# Patient Record
Sex: MALE | Race: WHITE | NOT HISPANIC OR LATINO | Employment: OTHER | URBAN - METROPOLITAN AREA
[De-identification: names, ages, dates, MRNs, and addresses within clinical notes are randomized per-mention and may not be internally consistent; named-entity substitution may affect disease eponyms.]

---

## 2017-01-19 ENCOUNTER — TRANSCRIBE ORDERS (OUTPATIENT)
Dept: SLEEP MEDICINE | Facility: HOSPITAL | Age: 67
End: 2017-01-19

## 2017-01-19 DIAGNOSIS — G47.61 PERIODIC LIMB MOVEMENT DISORDER: Primary | ICD-10-CM

## 2017-01-19 DIAGNOSIS — G47.33 OBSTRUCTIVE SLEEP APNEA (ADULT) (PEDIATRIC): ICD-10-CM

## 2017-02-02 ENCOUNTER — HOSPITAL ENCOUNTER (OUTPATIENT)
Dept: SLEEP MEDICINE | Facility: HOSPITAL | Age: 67
Discharge: HOME | End: 2017-02-02
Admitting: INTERNAL MEDICINE

## 2017-02-02 VITALS
OXYGEN SATURATION: 97 % | SYSTOLIC BLOOD PRESSURE: 140 MMHG | WEIGHT: 184.2 LBS | BODY MASS INDEX: 25.79 KG/M2 | HEART RATE: 62 BPM | DIASTOLIC BLOOD PRESSURE: 80 MMHG | HEIGHT: 71 IN

## 2017-02-02 DIAGNOSIS — G47.33 OBSTRUCTIVE SLEEP APNEA (ADULT) (PEDIATRIC): ICD-10-CM

## 2017-02-02 DIAGNOSIS — G47.61 PERIODIC LIMB MOVEMENT DISORDER: ICD-10-CM

## 2017-02-02 PROCEDURE — 95806 SLEEP STUDY UNATT&RESP EFFT: CPT | Performed by: INTERNAL MEDICINE

## 2017-02-02 RX ORDER — FLUTICASONE PROPIONATE 50 MCG
2 SPRAY, SUSPENSION (ML) NASAL DAILY
COMMUNITY
End: 2019-05-29

## 2017-02-02 RX ORDER — ALPRAZOLAM 1 MG/1
1 TABLET ORAL NIGHTLY PRN
COMMUNITY
End: 2019-05-29

## 2017-02-02 RX ORDER — NAPROXEN 500 MG/1
500 TABLET ORAL 2 TIMES DAILY WITH MEALS
COMMUNITY
End: 2019-06-14

## 2017-02-02 RX ORDER — AZELASTINE 1 MG/ML
2 SPRAY, METERED NASAL 2 TIMES DAILY
COMMUNITY
End: 2019-06-14

## 2017-02-02 RX ORDER — CITALOPRAM 10 MG/1
10 TABLET ORAL DAILY
COMMUNITY
End: 2019-05-29

## 2017-02-02 RX ORDER — IBUPROFEN 800 MG/1
800 TABLET ORAL EVERY 6 HOURS PRN
COMMUNITY
End: 2019-05-29

## 2019-05-29 ENCOUNTER — OFFICE VISIT (OUTPATIENT)
Dept: FAMILY MEDICINE CLINIC | Facility: CLINIC | Age: 69
End: 2019-05-29

## 2019-05-29 VITALS
HEART RATE: 64 BPM | HEIGHT: 72 IN | RESPIRATION RATE: 16 BRPM | DIASTOLIC BLOOD PRESSURE: 80 MMHG | SYSTOLIC BLOOD PRESSURE: 124 MMHG | WEIGHT: 208.8 LBS | OXYGEN SATURATION: 98 % | BODY MASS INDEX: 28.28 KG/M2

## 2019-05-29 DIAGNOSIS — Z00.00 INITIAL MEDICARE ANNUAL WELLNESS VISIT: Primary | ICD-10-CM

## 2019-05-29 DIAGNOSIS — F41.1 GAD (GENERALIZED ANXIETY DISORDER): ICD-10-CM

## 2019-05-29 DIAGNOSIS — R79.9 ABNORMAL FINDING OF BLOOD CHEMISTRY: ICD-10-CM

## 2019-05-29 DIAGNOSIS — Z12.5 SCREENING FOR PROSTATE CANCER: ICD-10-CM

## 2019-05-29 DIAGNOSIS — Z12.11 SCREENING FOR COLON CANCER: ICD-10-CM

## 2019-05-29 DIAGNOSIS — G25.81 RLS (RESTLESS LEGS SYNDROME): ICD-10-CM

## 2019-05-29 LAB
ALBUMIN SERPL-MCNC: 4.3 G/DL (ref 3.5–5.2)
ALBUMIN/GLOB SERPL: 1.4 G/DL
ALP SERPL-CCNC: 81 U/L (ref 39–117)
ALT SERPL W P-5'-P-CCNC: 32 U/L (ref 1–41)
ANION GAP SERPL CALCULATED.3IONS-SCNC: 14.5 MMOL/L
AST SERPL-CCNC: 40 U/L (ref 1–40)
BASOPHILS # BLD AUTO: 0.03 10*3/MM3 (ref 0–0.2)
BASOPHILS NFR BLD AUTO: 0.5 % (ref 0–1.5)
BILIRUB BLD-MCNC: NEGATIVE MG/DL
BILIRUB SERPL-MCNC: 0.4 MG/DL (ref 0.2–1.2)
BUN BLD-MCNC: 15 MG/DL (ref 8–23)
BUN/CREAT SERPL: 11.9 (ref 7–25)
CALCIUM SPEC-SCNC: 9.1 MG/DL (ref 8.6–10.5)
CHLORIDE SERPL-SCNC: 102 MMOL/L (ref 98–107)
CHOLEST SERPL-MCNC: 236 MG/DL (ref 0–200)
CLARITY, POC: CLEAR
CO2 SERPL-SCNC: 26.5 MMOL/L (ref 22–29)
COLOR UR: YELLOW
CREAT BLD-MCNC: 1.26 MG/DL (ref 0.76–1.27)
CRP SERPL-MCNC: 0.21 MG/DL (ref 0–0.5)
DEPRECATED RDW RBC AUTO: 46.3 FL (ref 37–54)
EOSINOPHIL # BLD AUTO: 0.09 10*3/MM3 (ref 0–0.4)
EOSINOPHIL NFR BLD AUTO: 1.6 % (ref 0.3–6.2)
ERYTHROCYTE [DISTWIDTH] IN BLOOD BY AUTOMATED COUNT: 13.9 % (ref 12.3–15.4)
GFR SERPL CREATININE-BSD FRML MDRD: 57 ML/MIN/1.73
GLOBULIN UR ELPH-MCNC: 3 GM/DL
GLUCOSE BLD-MCNC: 93 MG/DL (ref 65–99)
GLUCOSE UR STRIP-MCNC: NEGATIVE MG/DL
HBA1C MFR BLD: 5.63 % (ref 4.8–5.6)
HCT VFR BLD AUTO: 49.1 % (ref 37.5–51)
HCV AB SER DONR QL: NORMAL
HDLC SERPL-MCNC: 47 MG/DL (ref 40–60)
HGB BLD-MCNC: 15.6 G/DL (ref 13–17.7)
HIV1+2 AB SER QL: NORMAL
IMM GRANULOCYTES # BLD AUTO: 0.02 10*3/MM3 (ref 0–0.05)
IMM GRANULOCYTES NFR BLD AUTO: 0.4 % (ref 0–0.5)
KETONES UR QL: NEGATIVE
LDLC SERPL CALC-MCNC: 138 MG/DL (ref 0–100)
LDLC/HDLC SERPL: 2.94 {RATIO}
LEUKOCYTE EST, POC: NEGATIVE
LYMPHOCYTES # BLD AUTO: 1.78 10*3/MM3 (ref 0.7–3.1)
LYMPHOCYTES NFR BLD AUTO: 31.3 % (ref 19.6–45.3)
MCH RBC QN AUTO: 28.9 PG (ref 26.6–33)
MCHC RBC AUTO-ENTMCNC: 31.8 G/DL (ref 31.5–35.7)
MCV RBC AUTO: 91.1 FL (ref 79–97)
MONOCYTES # BLD AUTO: 0.5 10*3/MM3 (ref 0.1–0.9)
MONOCYTES NFR BLD AUTO: 8.8 % (ref 5–12)
NEUTROPHILS # BLD AUTO: 3.27 10*3/MM3 (ref 1.7–7)
NEUTROPHILS NFR BLD AUTO: 57.4 % (ref 42.7–76)
NITRITE UR-MCNC: NEGATIVE MG/ML
NRBC BLD AUTO-RTO: 0 /100 WBC (ref 0–0.2)
PH UR: 6.5 [PH] (ref 5–8)
PLATELET # BLD AUTO: 191 10*3/MM3 (ref 140–450)
PMV BLD AUTO: 12.1 FL (ref 6–12)
POTASSIUM BLD-SCNC: 4.1 MMOL/L (ref 3.5–5.2)
PROT SERPL-MCNC: 7.3 G/DL (ref 6–8.5)
PROT UR STRIP-MCNC: NEGATIVE MG/DL
PSA SERPL-MCNC: 1.1 NG/ML (ref 0–4)
RBC # BLD AUTO: 5.39 10*6/MM3 (ref 4.14–5.8)
RBC # UR STRIP: NEGATIVE /UL
SODIUM BLD-SCNC: 143 MMOL/L (ref 136–145)
SP GR UR: 1.03 (ref 1–1.03)
T4 FREE SERPL-MCNC: 1.12 NG/DL (ref 0.93–1.7)
TRIGL SERPL-MCNC: 255 MG/DL (ref 0–150)
TSH SERPL DL<=0.05 MIU/L-ACNC: 3.14 MIU/ML (ref 0.27–4.2)
URATE SERPL-MCNC: 6.5 MG/DL (ref 3.4–7)
UROBILINOGEN UR QL: NORMAL
VLDLC SERPL-MCNC: 51 MG/DL (ref 5–40)
WBC NRBC COR # BLD: 5.69 10*3/MM3 (ref 3.4–10.8)

## 2019-05-29 PROCEDURE — G0438 PPPS, INITIAL VISIT: HCPCS | Performed by: FAMILY MEDICINE

## 2019-05-29 PROCEDURE — 86140 C-REACTIVE PROTEIN: CPT | Performed by: FAMILY MEDICINE

## 2019-05-29 PROCEDURE — 93000 ELECTROCARDIOGRAM COMPLETE: CPT | Performed by: FAMILY MEDICINE

## 2019-05-29 PROCEDURE — 84550 ASSAY OF BLOOD/URIC ACID: CPT | Performed by: FAMILY MEDICINE

## 2019-05-29 PROCEDURE — G0103 PSA SCREENING: HCPCS | Performed by: FAMILY MEDICINE

## 2019-05-29 PROCEDURE — 84439 ASSAY OF FREE THYROXINE: CPT | Performed by: FAMILY MEDICINE

## 2019-05-29 PROCEDURE — 36415 COLL VENOUS BLD VENIPUNCTURE: CPT | Performed by: FAMILY MEDICINE

## 2019-05-29 PROCEDURE — 85025 COMPLETE CBC W/AUTO DIFF WBC: CPT | Performed by: FAMILY MEDICINE

## 2019-05-29 PROCEDURE — 84443 ASSAY THYROID STIM HORMONE: CPT | Performed by: FAMILY MEDICINE

## 2019-05-29 PROCEDURE — 83036 HEMOGLOBIN GLYCOSYLATED A1C: CPT | Performed by: FAMILY MEDICINE

## 2019-05-29 PROCEDURE — 86803 HEPATITIS C AB TEST: CPT | Performed by: FAMILY MEDICINE

## 2019-05-29 PROCEDURE — 80061 LIPID PANEL: CPT | Performed by: FAMILY MEDICINE

## 2019-05-29 PROCEDURE — 80053 COMPREHEN METABOLIC PANEL: CPT | Performed by: FAMILY MEDICINE

## 2019-05-29 PROCEDURE — 81003 URINALYSIS AUTO W/O SCOPE: CPT | Performed by: FAMILY MEDICINE

## 2019-05-29 PROCEDURE — G0432 EIA HIV-1/HIV-2 SCREEN: HCPCS | Performed by: FAMILY MEDICINE

## 2019-05-29 RX ORDER — ROPINIROLE 1 MG/1
TABLET, FILM COATED ORAL
COMMUNITY
Start: 2019-05-12 | End: 2021-08-13

## 2019-05-29 RX ORDER — ROPINIROLE 2 MG/1
TABLET, FILM COATED ORAL
COMMUNITY
Start: 2019-05-12 | End: 2021-08-13

## 2019-05-29 NOTE — PROGRESS NOTES
The ABCs of the Annual Wellness Visit    HEALTH RISK ASSESSMENT  INITIAL Medicare Wellness Visit  1950    Recent Hospitalizations: NONE    Current Medical Providers: Patient Care Team:  Juan Torre MD as PCP - General (Family Medicine)  Mega Guillen MD as Consulting Physician (Pulmonary Disease)    Smoking Status   Social History    Tobacco Use      Smoking status: Former Smoker        Packs/day: 1.00        Years: 10.00        Pack years: 10        Types: Cigarettes        Quit date:         Years since quittin.4      Smokeless tobacco: Never Used    Alcohol Consumption    reports that he drinks alcohol.    Depression Screen   PHQ-2 Depression Screening  Little interest or pleasure in doing things? 0   Feeling down, depressed, or hopeless? 0   PHQ-2 Total Score 0       Health Habits and Functional and Cognitive Screening  Functional & Cognitive Status 2019   Do you have difficulty preparing food and eating? No   Do you have difficulty bathing yourself, getting dressed or grooming yourself? No   Do you have difficulty using the toilet? No   Do you have difficulty moving around from place to place? No   Do you have trouble with steps or getting out of a bed or a chair? No   In the past year have you fallen or experienced a near fall? No   Current Diet Well Balanced Diet   Dental Exam Up to date   Eye Exam Up to date   Exercise (times per week) 7 times per week   Current Exercise Activities Include Walking   Do you need help using the phone?  No   Are you deaf or do you have serious difficulty hearing?  No   Do you need help with transportation? No   Do you need help shopping? No   Do you need help preparing meals?  No   Do you need help with housework?  No   Do you need help with laundry? No   Do you need help taking your medications? No   Do you need help managing money? No   Do you ever drive or ride in a car without wearing a seat belt? No   Have you felt unusual stress, anger or  loneliness in the last month? No   Who do you live with? Spouse   If you need help, do you have trouble finding someone available to you? No   Have you been bothered in the last four weeks by sexual problems? No   Do you have difficulty concentrating, remembering or making decisions? No      Fall Risk Assessment  Fall Risk Assessment was completed, and patient is at low risk for falls.    Does the patient have evidence of cognitive impairment? No    Recent Lab Results:  New patient to the office.  Previous provider records data deficit today (records requested).    Subjective     History of Present Illness  Adonay Eason is a 68 y.o. male who presents for an Initial Wellness Visit.  He is a new patient with previous and recent care by  Dr. Wayne,  Dr. Rahman, Dr. Gary and Dr. Guillen.  He describes a chronic pain history with previous orthopedic procedures involving his spine, knees and recently right shoulder.  He also describes a chronic mood disorder history and difficulty falling asleep.  He is presently taking two different NSAIDS intermittently for his pain.  He is inquiring about controlled substances to help his pain and mood disorder (patient presents typed sheet for review).    Review of Systems   HENT: Negative.  Negative for trouble swallowing.    Eyes: Negative.  Negative for visual disturbance.   Respiratory: Negative.  Negative for shortness of breath.    Cardiovascular: Negative.  Negative for chest pain, palpitations and leg swelling.   Gastrointestinal: Negative.    Endocrine: Negative.  Negative for polydipsia, polyphagia and polyuria.   Genitourinary: Negative.  Negative for difficulty urinating.   Musculoskeletal: Positive for arthralgias and back pain.   Skin: Negative.  Negative for rash.   Allergic/Immunologic: Negative.    Neurological: Negative.    Hematological: Negative.  Does not bruise/bleed easily.   Psychiatric/Behavioral: Positive for sleep disturbance. The patient is  "nervous/anxious.    All other systems reviewed and are negative.      The following portions of the patient's history were reviewed and updated as appropriate: past medical history, past surgical history, allergies, current medications, past family history and social history.        Current Outpatient Medications:   •  azelastine 0.1 % nasal spray, 2 sprays into each nostril 2 Times a Day.  Disp: , Rfl:   •  naproxen (NAPROSYN) 500 MG tablet, Take 500 mg by mouth 2 (Two) Times a Day With Meals., Disp: , Rfl:   •  rOPINIRole (REQUIP) 1 MG tablet, , Disp: , Rfl:   •  rOPINIRole (REQUIP) 2 MG tablet, , Disp: , Rfl:     Objective     Visual Acuity    Visual Acuity Screening    Right eye Left eye Both eyes   Without correction: 20/20 20/20 20/20   With correction:        Vitals:    05/29/19 0936   BP: 124/80   Pulse: 64   Resp: 16   SpO2: 98%   Weight: 94.7 kg (208 lb 12.8 oz)   Height: 181.6 cm (71.5\")   PainSc: 0-No pain     Body mass index is 28.72 kg/m². Discussed the patient's BMI with him. The BMI is in the acceptable range.    Physical Exam   Constitutional: He is oriented to person, place, and time. He appears well-developed and well-nourished. He is cooperative.   HENT:   Head: Normocephalic and atraumatic.   Right Ear: Hearing and external ear normal.   Left Ear: Hearing and external ear normal.   Nose: Nose normal.   Mouth/Throat: Uvula is midline, oropharynx is clear and moist and mucous membranes are normal.   Eyes: Conjunctivae and EOM are normal. Pupils are equal, round, and reactive to light. No scleral icterus.   Neck: Trachea normal and normal range of motion. Neck supple. No JVD present. Carotid bruit is not present. No thyromegaly present.   Cardiovascular: Normal rate, regular rhythm, normal heart sounds and intact distal pulses.   Pulmonary/Chest: Effort normal and breath sounds normal.   Abdominal: Soft. Bowel sounds are normal. There is no hepatosplenomegaly. There is no tenderness. "   Musculoskeletal: Normal range of motion.   Lymphadenopathy:     He has no cervical adenopathy.   Neurological: He is alert and oriented to person, place, and time. He has normal strength and normal reflexes. No sensory deficit. Gait normal.   Skin: Skin is warm and dry.   Psychiatric: His speech is normal and behavior is normal. Judgment and thought content normal. His mood appears anxious. Cognition and memory are normal.   Nursing note and vitals reviewed.      ECG 12 Lead  Date/Time: 5/29/2019 10:06 AM  Performed by: Juan Torre MD  Authorized by: Juan Torre MD   Comparison: not compared with previous ECG   Previous ECG: no previous ECG available  Rhythm: sinus rhythm  Rate: normal  BPM: 72  Conduction: incomplete right bundle branch block  ST Segments: ST segments normal  T Waves: T waves normal  QRS axis: normal    Clinical impression: abnormal EKG          Compared to one year ago, the patient feels his physical health is the same.  Compared to one year ago, the patient feels his mental health is the same.    Age-appropriate Screening Schedule  Refer to the list below for future screening recommendations based on patient's age, sex and/or medical conditions. Orders for these recommended tests are listed in the plan section. The patient has been provided with a written plan.    Health Maintenance   Topic Date Due   • INFLUENZA VACCINE  08/01/2019   • PNEUMOCOCCAL VACCINES (65+ LOW/MEDIUM RISK) (2 of 2 - PPSV23) 01/03/2020   • COLONOSCOPY  05/29/2029     Advance Care Planning  Patient has an advance directive - a copy has not been provided. Have asked the patient to send this to us to add to record    Identification of Risk Factors  Risk factors include: cardiovascular risk and chronic pain.      Assessment/Plan   Patient Self-Management and Personalized Health Advice  The patient has been provided with information about: diet, exercise, prevention of cardiac or vascular disease and mental health  concerns and preventive services including:   · Advance directive, Colorectal cancer screening, colonoscopy referral placed, Counseling for cardiovascular disease risk reduction, Diabetes screening, see lab orders, Exercise counseling provided, Fall Risk assessment done, Glaucoma screening recommended, Nutrition counseling provided, Prostate cancer screening discussed, Screening electrocardiogram.    Visit Diagnoses:    ICD-10-CM ICD-9-CM   1. Initial Medicare annual wellness visit Z00.00 V70.0   2. RLS (restless legs syndrome) G25.81 333.94   3. TENA (generalized anxiety disorder) F41.1 300.02   4. Screening for colon cancer Z12.11 V76.51   5. Screening for prostate cancer Z12.5 V76.44   6. Abnormal finding of blood chemistry  R79.9 790.6       Orders Placed This Encounter   Procedures   • Comprehensive Metabolic Panel   • Lipid Panel   • TSH   • T4, Free   • Uric Acid   • C-reactive Protein   • PSA Screen   • HIV-1 / O / 2 Ag / Antibody 4th Generation   • Hepatitis C Antibody   • Hemoglobin A1c   • Ambulatory Referral For Screening Colonoscopy   • Ambulatory Referral to Psychology   • Ambulatory Referral to Psychiatry   • POC Urinalysis Dipstick, Automated   • ECG 12 Lead   • CBC & Differential     We discussed NSAIDS.  I have advised using only one NSAID intermittently with food.  We discussed renal physiology.  We discussed a pain management referral (patient deferred today).  He will follow up with his recently treating orthopedist.  I deferred a script for controlled substances today.    Current outpatient and discharge medications have been reconciled for the patient.  Reviewed by: Juan Torre MD    Aspirin counseling:  We discussed current recommendations concerning daily low dose aspirin (81 mg) use in patients with cardiovascular disease.    Reviewed use of high risk medication in the elderly: Not applicable.  Reviewed for potential of harmful drug interactions in the elderly: Not applicable.    Follow  Up:  Return in about 1 year (around 5/29/2020), or if symptoms worsen or fail to improve, for Medicare Wellness.     An After Visit Summary and PPPS with all of these plans were given to the patient.         Juan Torre MD 05/29/19 10:28 AM

## 2019-06-14 ENCOUNTER — OFFICE VISIT (OUTPATIENT)
Dept: ORTHOPEDIC SURGERY | Facility: CLINIC | Age: 69
End: 2019-06-14

## 2019-06-14 VITALS — HEART RATE: 84 BPM | WEIGHT: 203 LBS | BODY MASS INDEX: 28.42 KG/M2 | OXYGEN SATURATION: 96 % | HEIGHT: 71 IN

## 2019-06-14 DIAGNOSIS — M16.12 PRIMARY OSTEOARTHRITIS OF LEFT HIP: Primary | ICD-10-CM

## 2019-06-14 DIAGNOSIS — M25.552 LEFT HIP PAIN: ICD-10-CM

## 2019-06-14 PROCEDURE — 99204 OFFICE O/P NEW MOD 45 MIN: CPT | Performed by: ORTHOPAEDIC SURGERY

## 2019-06-14 RX ORDER — ACETAMINOPHEN 325 MG/1
1000 TABLET ORAL ONCE
Status: CANCELLED | OUTPATIENT
Start: 2019-06-14 | End: 2019-06-14

## 2019-06-14 RX ORDER — MELOXICAM 7.5 MG/1
15 TABLET ORAL ONCE
Status: CANCELLED | OUTPATIENT
Start: 2019-06-14 | End: 2019-06-14

## 2019-06-14 RX ORDER — OXYCODONE HCL 10 MG/1
10 TABLET, FILM COATED, EXTENDED RELEASE ORAL ONCE
Status: CANCELLED | OUTPATIENT
Start: 2019-06-14 | End: 2019-06-14

## 2019-06-14 NOTE — PROGRESS NOTES
Orthopaedic Clinic Note: Hip New Patient    Chief Complaint   Patient presents with   • Left Hip - Pain        HPI    Adonay Eason is a 68 y.o. male who presents with left hip pain for 10 year(s). Onset has been atraumatic and gradual nature.  Pain is localized to his groin and lateral trochanter and radiates down his thigh to the knee.  His pain is worse with walking, standing, weightbearing activities.  He describes a sharp stabbing pain localized to the groin with hip movement as well as radiating down the leg.  He rates his pain a 5/10 on the pain scale.  With physical activity such as yard work, the pain increases to an 8/10 on the pain scale.  He has a known history of left hip arthritis and has been treated conservatively in the past with trochanteric bursa cortisone injections, prolonged history of ibuprofen and naproxen, physical therapy, and weight loss.  Despite these interventions, his pain is persisting and limiting daily activities.  He is actually had to limit his anti-inflammatory intake due to gastric irritation.  He is here to discuss treatment options due to his left hip pain which is causing severe limitations in daily activities and affecting his quality of life.    Past Medical History:   Diagnosis Date   • Chronic pain in right shoulder    • TENA (generalized anxiety disorder)    • DELANO (obstructive sleep apnea)    • RLS (restless legs syndrome)    • Seasonal allergies       Past Surgical History:   Procedure Laterality Date   • ANAL FISTULA REPAIR  2009   • CERVICAL DISCECTOMY ANTERIOR  2000   • COLONOSCOPY  2009   • KNEE CARTILAGE SURGERY Right 1977   • KNEE SURGERY Left 2000   • LUMBAR DISCECTOMY  2011   • SHOULDER ROTATOR CUFF REPAIR Right 2019      Family History   Problem Relation Age of Onset   • Dementia Mother    • Cholecystitis Father      Social History     Socioeconomic History   • Marital status:      Spouse name: Yoon   • Number of children: 0   • Years of education: H.S.    • Highest education level: High school graduate   Occupational History   • Occupation: Real Estate     Employer: RETIRED   Tobacco Use   • Smoking status: Former Smoker     Packs/day: 1.00     Years: 10.00     Pack years: 10.00     Types: Cigarettes     Last attempt to quit: 1980     Years since quittin.4   • Smokeless tobacco: Never Used   Substance and Sexual Activity   • Alcohol use: Yes     Frequency: 2-3 times a week     Drinks per session: 1 or 2     Binge frequency: Less than monthly   • Drug use: No   • Sexual activity: Yes     Partners: Female      Current Outpatient Medications on File Prior to Visit   Medication Sig Dispense Refill   • rOPINIRole (REQUIP) 1 MG tablet      • rOPINIRole (REQUIP) 2 MG tablet      • [DISCONTINUED] azelastine (ASTELIN) 0.1 % nasal spray 2 sprays into each nostril 2 (Two) Times a Day. Use in each nostril as directed     • [DISCONTINUED] naproxen (NAPROSYN) 500 MG tablet Take 500 mg by mouth 2 (Two) Times a Day With Meals.       No current facility-administered medications on file prior to visit.       No Known Allergies     Review of Systems   Constitutional: Negative.    HENT: Negative.    Eyes: Negative.    Respiratory: Negative.    Cardiovascular: Positive for chest pain.   Gastrointestinal: Negative.    Endocrine: Negative.    Genitourinary: Negative.    Musculoskeletal: Positive for arthralgias (hip pain).   Skin: Negative.    Allergic/Immunologic: Negative.    Neurological: Negative.    Hematological: Negative.    Psychiatric/Behavioral: Positive for sleep disturbance. The patient is nervous/anxious.         The patient's Review of Systems was personally reviewed and confirmed as accurate.    The following portions of the patient's history were reviewed and updated as appropriate: allergies, current medications, past family history, past medical history, past social history, past surgical history and problem list.    Physical Exam  Pulse 84, height 181.6 cm  "(71.5\"), weight 92.1 kg (203 lb), SpO2 96 %.    Body mass index is 27.92 kg/m².    GENERAL APPEARANCE: awake, alert & oriented x 3, in no acute distress and well developed, well nourished  PSYCH: normal affect  LUNGS:  breathing nonlabored  EYES: sclera anicteric  CARDIOVASCULAR: palpable dorsalis pedis, palpable posterior tibial bilaterally. Capillary refill less than 2 seconds  EXTREMITIES: no clubbing, cyanosis  GAIT:  Antalgic           Right Hip Exam:  RANGE OF MOTION:   FLEXION CONTRACTURE: None   FLEXION: 110 degrees   INTERNAL ROTATION: 15 degrees at 90 degrees of flexion   EXTERNAL ROTATION: 40 degrees at 90 degrees of flexion    PAIN WITH HIP MOTION: no  PAIN WITH LOGROLL: no  STINCHFIELD TEST: negative    KNEE EXAM: full knee ROM (0-120), stable to varus/valgus stress at terminal extension and 30 degrees     STRENGTH:  5/5 hip adduction, abduction, flexion. 5/5 strength knee flexion, extension. 5/5 strength ankle dorsiflexion and plantarflexion.     GREATER TROCHANTER BURSAL PAIN:  no     REFLEXES:   PATELLAR 2+/4   ACHILLES 2+/4    CLONUS: negative  STRAIGHT LEG TEST:   negative    SENSATION TO LIGHT TOUCH:  DEEP PERONEAL/SUPERFICIAL PERONEAL/SURAL/SAPHENOUS/TIBIAL:  intact    EDEMA:   no  ERYTHEMA:  no  WOUNDS/INCISIONS: none, no overlying skin problems.      Left Hip Exam:   RANGE OF MOTION:   FLEXION CONTRACTURE: None  FLEXION: 100 degrees  INTERNAL ROTATION: neutral degrees at 90 degrees of flexion   EXTERNAL ROTATION: 35 degrees at 90 degrees of flexion    PAIN WITH HIP MOTION: yes  PAIN WITH LOGROLL: no  STINCHFIELD TEST: positive    KNEE EXAM: full knee ROM (0-120), stable to varus/valgus stress at terminal extension and 30 degrees     STRENGTH:  5/5 hip adduction, abduction, flexion. 5/5 knee flexion, extension. 5/5 ankle dorsiflexion and plantarflexion.     GREATER TROCHANTER BURSAL PAIN:  yes     REFLEXES:   PATELLAR 2+/4   ACHILLES 2+/4    CLONUS: no  STRAIGHT LEG TEST:   negative    SENSATION " TO LIGHT TOUCH:  DEEP PERONEAL/SUPERFICIAL PERONEAL/SURAL/SAPHENOUS/TIBIAL:  intact    EDEMA:   no  ERYTHEMA:  no  WOUNDS/INCISIONS: no        ------------------------------------------------------------------    LEG LENGTHS:  equal  _____________________________________________________  _____________________________________________________    RADIOGRAPHIC FINDINGS:   Indication: Left hip pain    Comparison: No prior xrays are available for comparison    AP pelvis, hip 2 views: Right: advanced, end-stage osteoarthritis with bone on bone articulation, subchondral sclerosis, and subchondral cysts, there are marginal osteophytes visualized at the femoral head-neck junction and the margins of the acetabulum; Left: advanced, end-stage osteoarthritis with bone on bone articulation, subchondral sclerosis, and subchondral cysts, there are marginal osteophytes visualized at the femoral head-neck junction and the margins of the acetabulum    Assessment/Plan:   Diagnosis Plan   1. Primary osteoarthritis of left hip  Case Request    CBC and Differential    Basic metabolic panel    Protime-INR    APTT    Hemoglobin A1c    Urinalysis With Culture If Indicated - Urine, Clean Catch    ECG 12 Lead    Nicotine & Metabolite, Quant    Tranexamic Acid 1,000 mg in sodium chloride 0.9 % 100 mL    Tranexamic Acid 1,000 mg in sodium chloride 0.9 % 100 mL    ceFAZolin (ANCEF) 2 g in sodium chloride 0.9 % 100 mL IVPB    acetaminophen (TYLENOL) tablet 975 mg    meloxicam (MOBIC) tablet 15 mg    mupirocin (BACTROBAN) 2 % nasal ointment 1 application    oxyCODONE (oxyCONTIN) 12 hr tablet 10 mg    Case Request   2. Left hip pain  XR Hip With or Without Pelvis 1 View Left     Patient symptoms are related to left hip end-stage osteoarthritis.  I discussed conservative versus surgical treatment options.  Patient has failed multiple conservative treatments in the past and is not interested in further conservative options.  Given the severity of his  hip arthritis, I believe this is reasonable.  I recommended proceeding left total hip arthroplasty.  He is agreeable to this plan.    The patient has clinical and radiographic evidence of end-stage left hip joint degeneration. Conservative measures have been tried for 3 months or longer, but have failed to adequately treat or improve the patient's symptoms. Pain is restricting the patient's daily activities as well as quality of life. The recommendation at this time is to proceed with a left total hip arthroplasty with the goal to improve patient function and pain. The risks, benefits, potential complications, and alternatives were discussed with the patient in detail. Risks included but were not limited to bleeding, infection, anesthesia risks, damage to neurovascular structures, osteolysis, aseptic loosening, instability, dislocation, pain, continued pain, iatrogenic fracture, leg length discrepancy, possible need for future surgery including the potential for amputation, blood clots, myocardial infarction, stroke, and death. Paty-operative blood management and the potential for blood transfusion were discussed with risks and options clearly outlined. Specific details of the surgical procedure, hospitalization, recovery, rehabilitation, and long-term precautions were also presented. Pre-operative teaching was provided. Implant/prosthesis selection was outlined, and the many options available were explained; the final choice will be made at the time of the procedure to match the anatomy and condition of the bone, ligaments, tendons, and muscles. Given this instruction, the patient elected to proceed with the left total hip arthroplasty. The patient will be seen by pre-admission testing for pre-operative optimization and risk assessment and will be scheduled for surgery once this is completed.    The patient is considered standard risk for DVT based on patient risk factors and will be placed on aspirin  postoperatively for DVT prophylaxis.      Omi Osorio MD  06/14/19  9:05 AM

## 2019-06-18 ENCOUNTER — TELEPHONE (OUTPATIENT)
Dept: ORTHOPEDIC SURGERY | Facility: CLINIC | Age: 69
End: 2019-06-18

## 2019-06-18 NOTE — TELEPHONE ENCOUNTER
RETURNED PATIENT'S CALL ABOUT WANTING TO CANCEL SURGERY SCHEDULED FOR 7/1 WITH DR VILLALOBOS.  PATIENT DID NOT ANSWER, LEFT MESSAGE THAT I CANCELLED HIS SURGERY ON 7/1 PER HIS REQUEST AND TO CALL BACK TO RESCHEDULE IN [place]ALL AS HE REQUESTED.

## 2019-06-20 ENCOUNTER — APPOINTMENT (OUTPATIENT)
Dept: PREADMISSION TESTING | Facility: HOSPITAL | Age: 69
End: 2019-06-20

## 2020-05-20 ENCOUNTER — APPOINTMENT (OUTPATIENT)
Dept: CT IMAGING | Facility: HOSPITAL | Age: 70
End: 2020-05-20

## 2020-05-20 ENCOUNTER — APPOINTMENT (OUTPATIENT)
Dept: GENERAL RADIOLOGY | Facility: HOSPITAL | Age: 70
End: 2020-05-20

## 2020-05-20 ENCOUNTER — HOSPITAL ENCOUNTER (EMERGENCY)
Facility: HOSPITAL | Age: 70
Discharge: HOME | End: 2020-05-20
Attending: EMERGENCY MEDICINE | Admitting: EMERGENCY MEDICINE

## 2020-05-20 VITALS
HEART RATE: 64 BPM | BODY MASS INDEX: 29.4 KG/M2 | WEIGHT: 210 LBS | RESPIRATION RATE: 20 BRPM | DIASTOLIC BLOOD PRESSURE: 112 MMHG | HEIGHT: 71 IN | OXYGEN SATURATION: 96 % | TEMPERATURE: 97.8 F | SYSTOLIC BLOOD PRESSURE: 184 MMHG

## 2020-05-20 DIAGNOSIS — G43.109 MIGRAINE WITH AURA AND WITHOUT STATUS MIGRAINOSUS, NOT INTRACTABLE: Primary | ICD-10-CM

## 2020-05-20 LAB
ALT SERPL W P-5'-P-CCNC: 28 U/L (ref 1–41)
APTT PPP: 33.7 SECONDS (ref 24–37)
AST SERPL-CCNC: 37 U/L (ref 1–40)
BASE EXCESS BLDA CALC-SCNC: 1 MMOL/L (ref -5–5)
BASOPHILS # BLD AUTO: 0.03 10*3/MM3 (ref 0–0.2)
BASOPHILS NFR BLD AUTO: 0.5 % (ref 0–1.5)
CA-I BLDA-SCNC: 1.27 MMOL/L (ref 1.2–1.32)
CO2 BLDA-SCNC: 28 MMOL/L (ref 24–29)
CREAT BLDA-MCNC: 1.3 MG/DL (ref 0.6–1.3)
DEPRECATED RDW RBC AUTO: 46.7 FL (ref 37–54)
EOSINOPHIL # BLD AUTO: 0.05 10*3/MM3 (ref 0–0.4)
EOSINOPHIL NFR BLD AUTO: 0.8 % (ref 0.3–6.2)
ERYTHROCYTE [DISTWIDTH] IN BLOOD BY AUTOMATED COUNT: 14.3 % (ref 12.3–15.4)
HCO3 BLDA-SCNC: 26.4 MMOL/L (ref 22–26)
HCT VFR BLD AUTO: 47.5 % (ref 37.5–51)
HCT VFR BLDA CALC: 45 % (ref 38–51)
HGB BLD-MCNC: 16 G/DL (ref 13–17.7)
HGB BLDA-MCNC: 15.3 G/DL (ref 12–17)
HOLD SPECIMEN: NORMAL
HOLD SPECIMEN: NORMAL
IMM GRANULOCYTES # BLD AUTO: 0.04 10*3/MM3 (ref 0–0.05)
IMM GRANULOCYTES NFR BLD AUTO: 0.7 % (ref 0–0.5)
INR PPP: 1.11 (ref 0.85–1.16)
LYMPHOCYTES # BLD AUTO: 1.82 10*3/MM3 (ref 0.7–3.1)
LYMPHOCYTES NFR BLD AUTO: 29.7 % (ref 19.6–45.3)
MCH RBC QN AUTO: 30.4 PG (ref 26.6–33)
MCHC RBC AUTO-ENTMCNC: 33.7 G/DL (ref 31.5–35.7)
MCV RBC AUTO: 90.1 FL (ref 79–97)
MONOCYTES # BLD AUTO: 0.6 10*3/MM3 (ref 0.1–0.9)
MONOCYTES NFR BLD AUTO: 9.8 % (ref 5–12)
NEUTROPHILS # BLD AUTO: 3.59 10*3/MM3 (ref 1.7–7)
NEUTROPHILS NFR BLD AUTO: 58.5 % (ref 42.7–76)
NRBC BLD AUTO-RTO: 0 /100 WBC (ref 0–0.2)
PCO2 BLDA: 45.1 MM HG (ref 35–45)
PH BLDA: 7.38 PH UNITS (ref 7.35–7.6)
PLATELET # BLD AUTO: 189 10*3/MM3 (ref 140–450)
PMV BLD AUTO: 11.2 FL (ref 6–12)
PO2 BLDA: 29 MMHG (ref 80–105)
POTASSIUM BLDA-SCNC: 3.5 MMOL/L (ref 3.5–4.9)
PROTHROMBIN TIME: 14 SECONDS (ref 11.5–14)
RBC # BLD AUTO: 5.27 10*6/MM3 (ref 4.14–5.8)
SAO2 % BLDA: 54 % (ref 95–98)
SODIUM BLDA-SCNC: 142 MMOL/L (ref 138–146)
TROPONIN T SERPL-MCNC: <0.01 NG/ML (ref 0–0.03)
WBC NRBC COR # BLD: 6.13 10*3/MM3 (ref 3.4–10.8)
WHOLE BLOOD HOLD SPECIMEN: NORMAL
WHOLE BLOOD HOLD SPECIMEN: NORMAL

## 2020-05-20 PROCEDURE — 70496 CT ANGIOGRAPHY HEAD: CPT

## 2020-05-20 PROCEDURE — 84132 ASSAY OF SERUM POTASSIUM: CPT

## 2020-05-20 PROCEDURE — 85610 PROTHROMBIN TIME: CPT | Performed by: EMERGENCY MEDICINE

## 2020-05-20 PROCEDURE — 82947 ASSAY GLUCOSE BLOOD QUANT: CPT

## 2020-05-20 PROCEDURE — 85730 THROMBOPLASTIN TIME PARTIAL: CPT

## 2020-05-20 PROCEDURE — 25010000002 DEXAMETHASONE SODIUM PHOSPHATE 10 MG/ML SOLUTION: Performed by: EMERGENCY MEDICINE

## 2020-05-20 PROCEDURE — 93005 ELECTROCARDIOGRAM TRACING: CPT

## 2020-05-20 PROCEDURE — 82803 BLOOD GASES ANY COMBINATION: CPT

## 2020-05-20 PROCEDURE — 84484 ASSAY OF TROPONIN QUANT: CPT

## 2020-05-20 PROCEDURE — 96374 THER/PROPH/DIAG INJ IV PUSH: CPT

## 2020-05-20 PROCEDURE — 0 IOPAMIDOL PER 1 ML: Performed by: EMERGENCY MEDICINE

## 2020-05-20 PROCEDURE — 71045 X-RAY EXAM CHEST 1 VIEW: CPT

## 2020-05-20 PROCEDURE — 0042T HC CT CEREBRAL PERFUSION W/WO CONTRAST: CPT

## 2020-05-20 PROCEDURE — 99285 EMERGENCY DEPT VISIT HI MDM: CPT

## 2020-05-20 PROCEDURE — 82330 ASSAY OF CALCIUM: CPT

## 2020-05-20 PROCEDURE — 82565 ASSAY OF CREATININE: CPT

## 2020-05-20 PROCEDURE — 84450 TRANSFERASE (AST) (SGOT): CPT

## 2020-05-20 PROCEDURE — 70450 CT HEAD/BRAIN W/O DYE: CPT

## 2020-05-20 PROCEDURE — 85014 HEMATOCRIT: CPT

## 2020-05-20 PROCEDURE — 84460 ALANINE AMINO (ALT) (SGPT): CPT

## 2020-05-20 PROCEDURE — 70498 CT ANGIOGRAPHY NECK: CPT

## 2020-05-20 PROCEDURE — 25010000002 METOCLOPRAMIDE PER 10 MG: Performed by: EMERGENCY MEDICINE

## 2020-05-20 PROCEDURE — 85025 COMPLETE CBC W/AUTO DIFF WBC: CPT

## 2020-05-20 PROCEDURE — 84295 ASSAY OF SERUM SODIUM: CPT

## 2020-05-20 PROCEDURE — 96375 TX/PRO/DX INJ NEW DRUG ADDON: CPT

## 2020-05-20 PROCEDURE — 25010000002 DIPHENHYDRAMINE PER 50 MG: Performed by: EMERGENCY MEDICINE

## 2020-05-20 RX ORDER — LABETALOL HYDROCHLORIDE 5 MG/ML
20 INJECTION, SOLUTION INTRAVENOUS ONCE
Status: COMPLETED | OUTPATIENT
Start: 2020-05-20 | End: 2020-05-20

## 2020-05-20 RX ORDER — ALPRAZOLAM 1 MG/1
1-2 TABLET ORAL NIGHTLY PRN
COMMUNITY

## 2020-05-20 RX ORDER — ESOMEPRAZOLE MAGNESIUM 40 MG/1
40 CAPSULE, DELAYED RELEASE ORAL
COMMUNITY

## 2020-05-20 RX ORDER — METOCLOPRAMIDE HYDROCHLORIDE 5 MG/ML
10 INJECTION INTRAMUSCULAR; INTRAVENOUS ONCE
Status: COMPLETED | OUTPATIENT
Start: 2020-05-20 | End: 2020-05-20

## 2020-05-20 RX ORDER — DIPHENHYDRAMINE HYDROCHLORIDE 50 MG/ML
25 INJECTION INTRAMUSCULAR; INTRAVENOUS ONCE
Status: COMPLETED | OUTPATIENT
Start: 2020-05-20 | End: 2020-05-20

## 2020-05-20 RX ORDER — DEXAMETHASONE IN 0.9 % SOD CHL 10 MG/50ML
10 INTRAVENOUS SOLUTION, PIGGYBACK (ML) INTRAVENOUS ONCE
Status: COMPLETED | OUTPATIENT
Start: 2020-05-20 | End: 2020-05-20

## 2020-05-20 RX ORDER — SODIUM CHLORIDE 0.9 % (FLUSH) 0.9 %
10 SYRINGE (ML) INJECTION AS NEEDED
Status: DISCONTINUED | OUTPATIENT
Start: 2020-05-20 | End: 2020-05-20 | Stop reason: HOSPADM

## 2020-05-20 RX ADMIN — LABETALOL 20 MG/4 ML (5 MG/ML) INTRAVENOUS SYRINGE 20 MG: at 15:50

## 2020-05-20 RX ADMIN — DIPHENHYDRAMINE HYDROCHLORIDE 25 MG: 50 INJECTION INTRAMUSCULAR; INTRAVENOUS at 15:57

## 2020-05-20 RX ADMIN — DEXAMETHASONE SODIUM PHOSPHATE 10 MG: 10 INJECTION INTRAMUSCULAR; INTRAVENOUS at 16:02

## 2020-05-20 RX ADMIN — METOCLOPRAMIDE 10 MG: 5 INJECTION, SOLUTION INTRAMUSCULAR; INTRAVENOUS at 15:59

## 2020-05-20 RX ADMIN — IOPAMIDOL 115 ML: 755 INJECTION, SOLUTION INTRAVENOUS at 14:47

## 2020-05-20 NOTE — ED PROVIDER NOTES
Subjective   Pt presents with chief complaint of a headache for 2 days. He says 2 days ago he began  Having visual symptoms - seeing squares and squiggles in his left field of vision.  Lasted about 20 min then resolved, and soon after he developed a severe R frontal headache.  The headache has persisted but now is bifrontal.  Yesterday he had recurrence of the visual symptoms for a few minutes so he went to optometrist and had a dilated eye exam which he says was normal.  Today he was doing a little work at the computer and felt transiently confused and said he was having trouble hearing his wife so she insisted he come in for evaluation.  He currently has headache and denies all other symptoms.  He says at no point did he have weakness, numbness or speech changes.  Denies fever, cough, dyspnea, vomiting.    His BP is noted to be very high, he denies a history of HTN.      History provided by:  Patient      Review of Systems   Constitutional: Negative for fever.   Eyes: Positive for visual disturbance.   Respiratory: Negative for cough and shortness of breath.    Cardiovascular: Negative for chest pain.   Gastrointestinal: Negative for diarrhea and vomiting.   Neurological: Positive for headaches. Negative for speech difficulty, weakness and numbness.   All other systems reviewed and are negative.      Past Medical History:   Diagnosis Date   • Chronic pain in right shoulder    • TENA (generalized anxiety disorder)    • DELANO (obstructive sleep apnea)    • RLS (restless legs syndrome)    • Seasonal allergies        No Known Allergies    Past Surgical History:   Procedure Laterality Date   • ANAL FISTULA REPAIR  2009   • CERVICAL DISCECTOMY ANTERIOR  2000   • COLONOSCOPY  2009   • KNEE CARTILAGE SURGERY Right 1977   • KNEE SURGERY Left 2000   • LUMBAR DISCECTOMY  2011   • SHOULDER ROTATOR CUFF REPAIR Right 2019       Family History   Problem Relation Age of Onset   • Dementia Mother    • Cholecystitis Father         Social History     Socioeconomic History   • Marital status:      Spouse name: Yoon   • Number of children: 0   • Years of education: H.S.   • Highest education level: High school graduate   Occupational History   • Occupation: Real Estate     Employer: RETIRED   Tobacco Use   • Smoking status: Former Smoker     Packs/day: 1.00     Years: 10.00     Pack years: 10.00     Types: Cigarettes     Last attempt to quit: 1980     Years since quittin.4   • Smokeless tobacco: Never Used   Substance and Sexual Activity   • Alcohol use: Yes     Frequency: 2-3 times a week     Drinks per session: 1 or 2     Binge frequency: Less than monthly   • Drug use: No   • Sexual activity: Yes     Partners: Female           Objective   Physical Exam   Constitutional: He is oriented to person, place, and time. He appears well-developed and well-nourished. He is cooperative.  Non-toxic appearance. No distress.   HENT:   Head: Normocephalic and atraumatic.   Mouth/Throat: Oropharynx is clear and moist and mucous membranes are normal.   Eyes: Conjunctivae and EOM are normal. No scleral icterus.   Neck: Normal range of motion. Neck supple.   Cardiovascular: Normal rate, regular rhythm and normal heart sounds.   No murmur heard.  Pulmonary/Chest: Effort normal and breath sounds normal. No respiratory distress. He has no wheezes. He has no rhonchi. He has no rales.   Abdominal: Soft. Bowel sounds are normal. There is no tenderness. There is no rebound and no guarding.   Musculoskeletal: Normal range of motion.   Neurological: He is alert and oriented to person, place, and time. He has normal strength.   Speech fluent and articulate.  No facial droop.  5/5 strength in arms and legs.  Normal .  Mentation normal.  Visual fields are intact.     Skin: Skin is warm and dry. No rash noted.   Psychiatric: He has a normal mood and affect. His speech is normal and behavior is normal.   Nursing note and vitals  reviewed.      Procedures           ED Course         He was in the waiting room for approx 2 hours due to the ED being overcrowded.  When triaged he was made a stroke alert and I saw him in the CT scanner.  CT head negative.  NIHSS = 0.  This feels like a complicated migraine though he doesn't have a history of those.  Discussed with stroke team.  Not a tPA candidate as he has been symptomatic for 2 days.    EKG NSR.  Labs benign.  CTA/CTP negative.  Symptoms resolved with migraine meds.  BP remains high.  Patient stable on serial rechecks.  Discussed findings, concerns, plan of care, expected course, reasons to return and followup.  Provided the opportunity to ask questions.                                    MDM  Number of Diagnoses or Management Options  Migraine with aura and without status migrainosus, not intractable:      Amount and/or Complexity of Data Reviewed  Clinical lab tests: reviewed and ordered  Tests in the radiology section of CPT®: ordered and reviewed  Discuss the patient with other providers: yes  Independent visualization of images, tracings, or specimens: yes        Final diagnoses:   Migraine with aura and without status migrainosus, not intractable            Ryan Luciano MD  05/20/20 3123

## 2021-08-13 ENCOUNTER — OFFICE VISIT (OUTPATIENT)
Dept: ORTHOPEDIC SURGERY | Facility: CLINIC | Age: 71
End: 2021-08-13

## 2021-08-13 VITALS
WEIGHT: 206.4 LBS | SYSTOLIC BLOOD PRESSURE: 158 MMHG | HEIGHT: 71 IN | DIASTOLIC BLOOD PRESSURE: 96 MMHG | BODY MASS INDEX: 28.9 KG/M2 | HEART RATE: 69 BPM

## 2021-08-13 DIAGNOSIS — M16.12 PRIMARY OSTEOARTHRITIS OF LEFT HIP: Primary | ICD-10-CM

## 2021-08-13 DIAGNOSIS — M16.11 PRIMARY OSTEOARTHRITIS OF RIGHT HIP: ICD-10-CM

## 2021-08-13 PROCEDURE — 99214 OFFICE O/P EST MOD 30 MIN: CPT | Performed by: ORTHOPAEDIC SURGERY

## 2021-08-13 RX ORDER — PRAMIPEXOLE DIHYDROCHLORIDE 1.5 MG/1
1.5 TABLET ORAL 3 TIMES DAILY
COMMUNITY
Start: 2021-07-26

## 2021-08-13 RX ORDER — PREGABALIN 75 MG/1
75 CAPSULE ORAL ONCE
Status: CANCELLED | OUTPATIENT
Start: 2021-08-13 | End: 2021-08-13

## 2021-08-13 RX ORDER — NAPROXEN SODIUM 220 MG
220 TABLET ORAL AS NEEDED
Status: ON HOLD | COMMUNITY
End: 2021-12-01

## 2021-08-13 RX ORDER — OMEGA-3 FATTY ACIDS/FISH OIL 300-1000MG
CAPSULE ORAL AS NEEDED
Status: ON HOLD | COMMUNITY
End: 2021-12-01

## 2021-08-13 RX ORDER — ACETAMINOPHEN 325 MG/1
1000 TABLET ORAL ONCE
Status: CANCELLED | OUTPATIENT
Start: 2021-08-13 | End: 2021-08-13

## 2021-08-13 RX ORDER — PRAMIPEXOLE DIHYDROCHLORIDE 0.5 MG/1
TABLET ORAL
COMMUNITY
Start: 2021-07-01 | End: 2021-08-13

## 2021-08-13 RX ORDER — MELOXICAM 7.5 MG/1
15 TABLET ORAL ONCE
Status: CANCELLED | OUTPATIENT
Start: 2021-08-13 | End: 2021-08-13

## 2021-08-13 NOTE — PROGRESS NOTES
Orthopaedic Clinic Note: Hip Established Patient    Chief Complaint   Patient presents with   • Follow-up     2 year f/u--Primary osteoarthritis of left hip ; right hip pain        HPI    It has been 2  year(s) since Mr. Eason's last visit. He returns to clinic today for follow-up bilateral hip osteoarthritis.  He was last seen 2 years ago and at that time was scheduled for total hip arthroplasty.  Due to trauma to his shoulder requiring rotator cuff surgery as well as the onset of Covid, he failed to follow through with surgical intervention.  He has been dealing with the pain with oral anti-inflammatories but is continuing to have worsening functional levels.  He has several episodes of buckling over the past several months and near falls as a result of his hip pain.  He rates his pain 8/10 on the pain scale.  He is ambulating with no assistive device.  He denies fevers chills or constitutional symptoms.  He is here today to discuss surgical intervention on his hips as he is severely limited in daily activities and is affecting his quality of life.    I have reviewed the following portions of the patient's history:History of Present Illness    Past Medical History:   Diagnosis Date   • Chronic pain in right shoulder    • TENA (generalized anxiety disorder)    • DELANO (obstructive sleep apnea)    • RLS (restless legs syndrome)    • Seasonal allergies       Past Surgical History:   Procedure Laterality Date   • ANAL FISTULA REPAIR  2009   • CERVICAL DISCECTOMY ANTERIOR  2000   • COLONOSCOPY  2009   • KNEE CARTILAGE SURGERY Right 1977   • KNEE SURGERY Left 2000   • LUMBAR DISCECTOMY  2011   • SHOULDER ROTATOR CUFF REPAIR Right 2019   • SHOULDER SURGERY Right 02/2019    RCR      Family History   Problem Relation Age of Onset   • Dementia Mother    • Cholecystitis Father      Social History     Socioeconomic History   • Marital status:      Spouse name: Yoon   • Number of children: 0   • Years of education: H.S.  "  • Highest education level: High school graduate   Tobacco Use   • Smoking status: Former Smoker     Packs/day: 1.00     Years: 10.00     Pack years: 10.00     Types: Cigarettes     Quit date:      Years since quittin.6   • Smokeless tobacco: Never Used   Substance and Sexual Activity   • Alcohol use: Yes   • Drug use: No   • Sexual activity: Yes     Partners: Female      Current Outpatient Medications on File Prior to Visit   Medication Sig Dispense Refill   • ALPRAZolam (XANAX) 1 MG tablet Take 2 mg by mouth At Night As Needed for Anxiety.     • esomeprazole (nexIUM) 40 MG capsule Take 40 mg by mouth Every Morning Before Breakfast.     • Ibuprofen 200 MG capsule Take  by mouth As Needed.     • naproxen sodium (ALEVE) 220 MG tablet Take 220 mg by mouth As Needed.     • pramipexole (MIRAPEX) 1.5 MG tablet      • [DISCONTINUED] pramipexole (MIRAPEX) 0.5 MG tablet      • [DISCONTINUED] rOPINIRole (REQUIP) 1 MG tablet      • [DISCONTINUED] rOPINIRole (REQUIP) 2 MG tablet        No current facility-administered medications on file prior to visit.      No Known Allergies     Review of Systems   Constitutional: Positive for fatigue.   HENT: Positive for dental problem.    Eyes: Negative.    Respiratory: Negative.    Cardiovascular: Positive for leg swelling.   Gastrointestinal: Negative.    Endocrine: Negative.    Genitourinary: Negative.    Musculoskeletal: Positive for arthralgias and back pain.   Skin: Positive for rash.   Allergic/Immunologic: Negative.    Neurological: Negative.    Hematological: Negative.    Psychiatric/Behavioral: The patient is nervous/anxious.         The patient's Review of Systems was personally reviewed and confirmed as accurate.    Physical Exam  Blood pressure 158/96, pulse 69, height 180.3 cm (70.98\"), weight 93.6 kg (206 lb 6.4 oz).    Body mass index is 28.8 kg/m².    GENERAL APPEARANCE: awake, alert, oriented, in no acute distress and well developed, well nourished  LUNGS:  " breathing nonlabored  EXTREMITIES: no clubbing, cyanosis  PERIPHERAL PULSES: palpable dorsalis pedis and posterior tibial pulses bilaterally.    GAIT:  Antalgic            Hip Exam:  Bilateral    RANGE OF MOTION:  EXTENSION/FLEXION:  normal (0-110 degrees)  IR (at 90 degrees of flexion):  neutral  ER (at 90 degrees of flexion):  25 on right, 30 on left  PAIN WITH HIP MOTION:  yes, Localized to groin and lateral trochanter  PAIN WITH LOGROLL:  no     STINCFIELD TEST: positive    STRENGTH:  ABDUCTOR:  5/5  ADDUCTOR:  5/5  HIP FLEXION:  5/5    GREATER TROCHANTER BURSAL PAIN:  yes    SENSATION TO LIGHT TOUCH:  DEEP PERONEAL/SUPERFICIAL PERONEAL/SURAL/SAPHENOUS/TIBIAL:   intact    EDEMA:  no  ERYTHEMA:  no  WOUNDS/INCISIONS:   no  _________________________________________________________________  _________________________________________________________________    RADIOGRAPHIC FINDINGS:   Indication: Bilateral hip pain    Comparison: Todays xrays were compared to previous xrays from 6/14/2019    AP pelvis: Right: advanced, end-stage osteoarthritis with bone on bone articulation, subchondral sclerosis, and subchondral cysts, there are marginal osteophytes visualized at the femoral head-neck junction and acetabular margins and No significant changes compared to prior radiographs.;Left: advanced, end-stage osteoarthritis with bone on bone articulation, subchondral sclerosis, and subchondral cysts, there are marginal osteophytes visualized at the femoral head-neck junction and acetabular margins and No significant changes compared to prior radiographs.      Assessment/Plan:   Diagnosis Plan   1. Primary osteoarthritis of left hip  XR Hips Bilateral With or Without Pelvis 3-4 View    Case Request    CBC and Differential    Basic metabolic panel    Protime-INR    APTT    Hemoglobin A1c    ECG 12 Lead    Nicotine & Metabolite, Quant    Tranexamic Acid 1,000 mg in sodium chloride 0.9 % 100 mL    Tranexamic Acid 1,000 mg in  sodium chloride 0.9 % 100 mL    ceFAZolin (ANCEF) 2 g in sodium chloride 0.9 % 100 mL IVPB    acetaminophen (TYLENOL) tablet 975 mg    meloxicam (MOBIC) tablet 15 mg    pregabalin (LYRICA) capsule 75 mg    mupirocin (BACTROBAN) 2 % nasal ointment 1 application    Case Request   2. Primary osteoarthritis of right hip       Patient has advanced arthritis of bilateral hips.  He is interested in pursuing staged total hip arthroplasty starting with the left side as it is the most symptomatic.  He has failed multiple conservative interventions.  We will proceed with scheduling staged surgery per his request.    The patient has clinical and radiographic evidence of end-stage left hip joint degeneration. Conservative measures have been tried for 3 months or longer, but have failed to adequately treat or improve the patient's symptoms. Pain is restricting the patient's daily activities as well as quality of life. The recommendation at this time is to proceed with a left total hip arthroplasty with the goal to improve patient function and pain. The risks, benefits, potential complications, and alternatives were discussed with the patient in detail. Risks included but were not limited to bleeding, infection, anesthesia risks, damage to neurovascular structures, osteolysis, aseptic loosening, instability, dislocation, pain, continued pain, iatrogenic fracture, leg length discrepancy, possible need for future surgery including the potential for amputation, blood clots, myocardial infarction, stroke, and death. Paty-operative blood management and the potential for blood transfusion were discussed with risks and options clearly outlined. Specific details of the surgical procedure, hospitalization, recovery, rehabilitation, and long-term precautions were also presented. Pre-operative teaching was provided. Implant/prosthesis selection was outlined, and the many options available were explained; the final choice will be made at the  time of the procedure to match the anatomy and condition of the bone, ligaments, tendons, and muscles. Given this instruction, the patient elected to proceed with the left total hip arthroplasty. The patient will be seen by pre-admission testing for pre-operative optimization and risk assessment and will be scheduled for surgery once this is completed.    The patient is considered standard risk for DVT based on patient risk factors and will be placed on aspirin postoperatively for DVT prophylaxis.      Omi Osorio MD  08/13/21  13:19 EDT

## 2021-09-01 ENCOUNTER — APPOINTMENT (OUTPATIENT)
Dept: PREADMISSION TESTING | Facility: HOSPITAL | Age: 71
End: 2021-09-01

## 2021-09-06 ENCOUNTER — APPOINTMENT (OUTPATIENT)
Dept: PREADMISSION TESTING | Facility: HOSPITAL | Age: 71
End: 2021-09-06

## 2021-11-09 ENCOUNTER — TELEPHONE (OUTPATIENT)
Dept: ORTHOPEDIC SURGERY | Facility: CLINIC | Age: 71
End: 2021-11-09

## 2021-11-09 NOTE — TELEPHONE ENCOUNTER
Provider:Jo    Caller:  Rosa robison to Patient: spouse    Phone Number:[phone]    Reason for Call:Pre op is next Thursday 11/18/21:  req jimmy back from [facility] - please give patient a call back re: the videos he ahs to watch and sign off on, prior to hs sx 12/1/2021.  REQUESTS to please leave a message with number, where they can call you back if you don;t get them (may not answer due to spam call showing up).

## 2021-11-18 ENCOUNTER — PRE-ADMISSION TESTING (OUTPATIENT)
Dept: PREADMISSION TESTING | Facility: HOSPITAL | Age: 71
End: 2021-11-18

## 2021-11-18 VITALS — BODY MASS INDEX: 29.83 KG/M2 | HEIGHT: 72 IN | WEIGHT: 220.24 LBS

## 2021-11-18 DIAGNOSIS — M16.12 PRIMARY OSTEOARTHRITIS OF LEFT HIP: ICD-10-CM

## 2021-11-18 LAB
ANION GAP SERPL CALCULATED.3IONS-SCNC: 10 MMOL/L (ref 5–15)
APTT PPP: 32.7 SECONDS (ref 22–39)
BASOPHILS # BLD AUTO: 0.05 10*3/MM3 (ref 0–0.2)
BASOPHILS NFR BLD AUTO: 0.7 % (ref 0–1.5)
BUN SERPL-MCNC: 18 MG/DL (ref 8–23)
BUN/CREAT SERPL: 15.3 (ref 7–25)
CALCIUM SPEC-SCNC: 9.4 MG/DL (ref 8.6–10.5)
CHLORIDE SERPL-SCNC: 104 MMOL/L (ref 98–107)
CO2 SERPL-SCNC: 27 MMOL/L (ref 22–29)
CREAT SERPL-MCNC: 1.18 MG/DL (ref 0.76–1.27)
DEPRECATED RDW RBC AUTO: 45.1 FL (ref 37–54)
EOSINOPHIL # BLD AUTO: 0.09 10*3/MM3 (ref 0–0.4)
EOSINOPHIL NFR BLD AUTO: 1.3 % (ref 0.3–6.2)
ERYTHROCYTE [DISTWIDTH] IN BLOOD BY AUTOMATED COUNT: 13.4 % (ref 12.3–15.4)
GFR SERPL CREATININE-BSD FRML MDRD: 61 ML/MIN/1.73
GLUCOSE SERPL-MCNC: 98 MG/DL (ref 65–99)
HBA1C MFR BLD: 5.7 % (ref 4.8–5.6)
HCT VFR BLD AUTO: 46.9 % (ref 37.5–51)
HGB BLD-MCNC: 15.7 G/DL (ref 13–17.7)
IMM GRANULOCYTES # BLD AUTO: 0.02 10*3/MM3 (ref 0–0.05)
IMM GRANULOCYTES NFR BLD AUTO: 0.3 % (ref 0–0.5)
INR PPP: 1.06 (ref 0.85–1.16)
LYMPHOCYTES # BLD AUTO: 2.39 10*3/MM3 (ref 0.7–3.1)
LYMPHOCYTES NFR BLD AUTO: 34.9 % (ref 19.6–45.3)
MCH RBC QN AUTO: 30.4 PG (ref 26.6–33)
MCHC RBC AUTO-ENTMCNC: 33.5 G/DL (ref 31.5–35.7)
MCV RBC AUTO: 90.7 FL (ref 79–97)
MONOCYTES # BLD AUTO: 0.6 10*3/MM3 (ref 0.1–0.9)
MONOCYTES NFR BLD AUTO: 8.8 % (ref 5–12)
NEUTROPHILS NFR BLD AUTO: 3.7 10*3/MM3 (ref 1.7–7)
NEUTROPHILS NFR BLD AUTO: 54 % (ref 42.7–76)
NRBC BLD AUTO-RTO: 0 /100 WBC (ref 0–0.2)
PLATELET # BLD AUTO: 194 10*3/MM3 (ref 140–450)
PMV BLD AUTO: 10.8 FL (ref 6–12)
POTASSIUM SERPL-SCNC: 4.6 MMOL/L (ref 3.5–5.2)
PROTHROMBIN TIME: 13.4 SECONDS (ref 11.4–14.4)
QT INTERVAL: 406 MS
QTC INTERVAL: 422 MS
RBC # BLD AUTO: 5.17 10*6/MM3 (ref 4.14–5.8)
SODIUM SERPL-SCNC: 141 MMOL/L (ref 136–145)
WBC NRBC COR # BLD: 6.85 10*3/MM3 (ref 3.4–10.8)

## 2021-11-18 PROCEDURE — 93010 ELECTROCARDIOGRAM REPORT: CPT | Performed by: INTERNAL MEDICINE

## 2021-11-18 PROCEDURE — 93005 ELECTROCARDIOGRAM TRACING: CPT

## 2021-11-18 PROCEDURE — 85730 THROMBOPLASTIN TIME PARTIAL: CPT

## 2021-11-18 PROCEDURE — G0480 DRUG TEST DEF 1-7 CLASSES: HCPCS

## 2021-11-18 PROCEDURE — 83036 HEMOGLOBIN GLYCOSYLATED A1C: CPT

## 2021-11-18 PROCEDURE — 80048 BASIC METABOLIC PNL TOTAL CA: CPT

## 2021-11-18 PROCEDURE — 85610 PROTHROMBIN TIME: CPT

## 2021-11-18 PROCEDURE — 85025 COMPLETE CBC W/AUTO DIFF WBC: CPT

## 2021-11-18 PROCEDURE — 36415 COLL VENOUS BLD VENIPUNCTURE: CPT

## 2021-11-18 RX ORDER — NAPROXEN SOD/DIPHENHYDRAMINE 220MG-25MG
1 TABLET ORAL NIGHTLY
COMMUNITY
End: 2021-12-01 | Stop reason: HOSPADM

## 2021-11-18 RX ORDER — OXYMETAZOLINE HCL 0.05 %
SPRAY, NON-AEROSOL (ML) NASAL NIGHTLY PRN
COMMUNITY

## 2021-11-18 ASSESSMENT — HOOS JR
HOOS JR SCORE: 14
HOOS JR SCORE: 46.652

## 2021-11-18 NOTE — PAT
Patient to apply Chlorhexadine wipes  to surgical area (as instructed) the night before procedure and the AM of procedure. Wipes provided.    Patient instructed to drink 20 ounces (or until full) of Gatorade and it needs to be completed 1 hour (for Main OR patients) or 2 hours (scheduled  section patients) before given arrival time for procedure (NO RED Gatorade)    Patient verbalized understanding.    COVID TEST SCHEDULED 2021. PATIENT AWARE.     Prescription for Bactroban and Chlorhexidine shower called into patient's pharmacy or [facility] pharmacy by patient's surgeon.  Reinforced with patient to  the prescription from applicable pharmacy if they haven't already.  Verbal and written instructions given regarding proper use of the Bactroban and Chlorhexidine to patient and/or famlily during PAT visit. Patient/family also instructed to complete checklist and return it to Pre-op on the day of surgery.  Patient and/or family verbalized understanding.      Discussed with patient options for receiving total joint replacement education and assessed patient's ability and preference. Joint Replacement Guide given to patient during PAT visit since not received a copy within the last year. Encouraged patient/family to read guide thoroughly and notify PAT staff with any questions or concerns. Handout provided directing patient to links to watch online videos related to joint replacement surgery on the [facility] website. The handout gives detailed instructions for joining an online joint replacement class through Zoom or phone conference offered on . Patient agreed to participate by watching videos online. Patient verbalized understanding of instructions and to complete the online learning tool survey. Encouraged to share information with family and/or . An overview of the joint replacement education was provided during the visit including general perioperative instructions that are routine  for all surgical patients (PAT PASS, wipes, directions to pre-op, etc.).

## 2021-11-23 LAB
COTININE SERPL-MCNC: <1 NG/ML
NICOTINE SERPL-MCNC: <1 NG/ML

## 2021-11-29 ENCOUNTER — APPOINTMENT (OUTPATIENT)
Dept: PREADMISSION TESTING | Facility: HOSPITAL | Age: 71
End: 2021-11-29

## 2021-11-29 LAB — SARS-COV-2 RNA PNL SPEC NAA+PROBE: NOT DETECTED

## 2021-11-29 PROCEDURE — U0004 COV-19 TEST NON-CDC HGH THRU: HCPCS | Performed by: ORTHOPAEDIC SURGERY

## 2021-12-01 ENCOUNTER — APPOINTMENT (OUTPATIENT)
Dept: GENERAL RADIOLOGY | Facility: HOSPITAL | Age: 71
End: 2021-12-01

## 2021-12-01 ENCOUNTER — ANESTHESIA EVENT CONVERTED (OUTPATIENT)
Dept: ANESTHESIOLOGY | Facility: HOSPITAL | Age: 71
End: 2021-12-01

## 2021-12-01 ENCOUNTER — ANESTHESIA EVENT (OUTPATIENT)
Dept: PERIOP | Facility: HOSPITAL | Age: 71
End: 2021-12-01

## 2021-12-01 ENCOUNTER — HOSPITAL ENCOUNTER (OUTPATIENT)
Facility: HOSPITAL | Age: 71
Discharge: HOME | End: 2021-12-01
Attending: ORTHOPAEDIC SURGERY | Admitting: ORTHOPAEDIC SURGERY

## 2021-12-01 ENCOUNTER — ANESTHESIA (OUTPATIENT)
Dept: PERIOP | Facility: HOSPITAL | Age: 71
End: 2021-12-01

## 2021-12-01 VITALS
RESPIRATION RATE: 16 BRPM | TEMPERATURE: 98.2 F | WEIGHT: 220 LBS | OXYGEN SATURATION: 95 % | HEIGHT: 72 IN | BODY MASS INDEX: 29.8 KG/M2 | DIASTOLIC BLOOD PRESSURE: 82 MMHG | SYSTOLIC BLOOD PRESSURE: 129 MMHG | HEART RATE: 76 BPM

## 2021-12-01 DIAGNOSIS — M16.12 PRIMARY OSTEOARTHRITIS OF LEFT HIP: ICD-10-CM

## 2021-12-01 DIAGNOSIS — Z96.642 STATUS POST TOTAL HIP REPLACEMENT, LEFT: Primary | ICD-10-CM

## 2021-12-01 PROBLEM — F41.9 ANXIETY: Status: ACTIVE | Noted: 2021-12-01

## 2021-12-01 PROBLEM — G47.33 OSA (OBSTRUCTIVE SLEEP APNEA): Status: ACTIVE | Noted: 2021-12-01

## 2021-12-01 PROBLEM — R73.09 ELEVATED HEMOGLOBIN A1C: Status: ACTIVE | Noted: 2021-12-01

## 2021-12-01 LAB — GLUCOSE BLDC GLUCOMTR-MCNC: 84 MG/DL (ref 70–130)

## 2021-12-01 PROCEDURE — 25010000002 PROPOFOL 10 MG/ML EMULSION: Performed by: NURSE ANESTHETIST, CERTIFIED REGISTERED

## 2021-12-01 PROCEDURE — 25010000002 DEXAMETHASONE PER 1 MG: Performed by: NURSE ANESTHETIST, CERTIFIED REGISTERED

## 2021-12-01 PROCEDURE — 25010000002 ROPIVACAINE PER 1 MG: Performed by: ORTHOPAEDIC SURGERY

## 2021-12-01 PROCEDURE — A9270 NON-COVERED ITEM OR SERVICE: HCPCS | Performed by: ORTHOPAEDIC SURGERY

## 2021-12-01 PROCEDURE — G0378 HOSPITAL OBSERVATION PER HR: HCPCS

## 2021-12-01 PROCEDURE — 0 CEFAZOLIN IN DEXTROSE 2-4 GM/100ML-% SOLUTION: Performed by: ORTHOPAEDIC SURGERY

## 2021-12-01 PROCEDURE — 97116 GAIT TRAINING THERAPY: CPT

## 2021-12-01 PROCEDURE — 72170 X-RAY EXAM OF PELVIS: CPT

## 2021-12-01 PROCEDURE — 63710000001 MELOXICAM 15 MG TABLET: Performed by: ORTHOPAEDIC SURGERY

## 2021-12-01 PROCEDURE — 63710000001 POVIDONE-IODINE 10 % SOLUTION 30 ML BOTTLE: Performed by: ORTHOPAEDIC SURGERY

## 2021-12-01 PROCEDURE — C1889 IMPLANT/INSERT DEVICE, NOC: HCPCS | Performed by: ORTHOPAEDIC SURGERY

## 2021-12-01 PROCEDURE — A9270 NON-COVERED ITEM OR SERVICE: HCPCS | Performed by: ANESTHESIOLOGY

## 2021-12-01 PROCEDURE — 63710000001 FAMOTIDINE 20 MG TABLET: Performed by: ANESTHESIOLOGY

## 2021-12-01 PROCEDURE — 27130 TOTAL HIP ARTHROPLASTY: CPT | Performed by: ORTHOPAEDIC SURGERY

## 2021-12-01 PROCEDURE — C1776 JOINT DEVICE (IMPLANTABLE): HCPCS | Performed by: ORTHOPAEDIC SURGERY

## 2021-12-01 PROCEDURE — 25010000002 KETOROLAC TROMETHAMINE PER 15 MG: Performed by: ORTHOPAEDIC SURGERY

## 2021-12-01 PROCEDURE — 82962 GLUCOSE BLOOD TEST: CPT

## 2021-12-01 PROCEDURE — 97161 PT EVAL LOW COMPLEX 20 MIN: CPT

## 2021-12-01 PROCEDURE — 63710000001 PREGABALIN 75 MG CAPSULE: Performed by: ORTHOPAEDIC SURGERY

## 2021-12-01 PROCEDURE — 25010000002 ONDANSETRON PER 1 MG: Performed by: NURSE ANESTHETIST, CERTIFIED REGISTERED

## 2021-12-01 PROCEDURE — C1755 CATHETER, INTRASPINAL: HCPCS | Performed by: ORTHOPAEDIC SURGERY

## 2021-12-01 PROCEDURE — 25010000002 MORPHINE PER 10 MG: Performed by: ORTHOPAEDIC SURGERY

## 2021-12-01 PROCEDURE — 63710000001 ACETAMINOPHEN 500 MG TABLET: Performed by: ORTHOPAEDIC SURGERY

## 2021-12-01 PROCEDURE — 25010000002 MIDAZOLAM PER 1 MG: Performed by: ANESTHESIOLOGY

## 2021-12-01 PROCEDURE — 27130 TOTAL HIP ARTHROPLASTY: CPT | Performed by: PHYSICIAN ASSISTANT

## 2021-12-01 PROCEDURE — S0260 H&P FOR SURGERY: HCPCS | Performed by: ORTHOPAEDIC SURGERY

## 2021-12-01 DEVICE — TRIDENT X3 0 DEGREE POLYETHYLENE INSERT
Type: IMPLANTABLE DEVICE | Site: HIP | Status: FUNCTIONAL
Brand: TRIDENT X3 INSERT

## 2021-12-01 DEVICE — TRIDENT II TRITANIUM CLUSTER 58F
Type: IMPLANTABLE DEVICE | Site: HIP | Status: FUNCTIONAL
Brand: TRIDENT II

## 2021-12-01 DEVICE — 6.5MM LOW PROFILE HEX SCREW 35MM
Type: IMPLANTABLE DEVICE | Site: HIP | Status: FUNCTIONAL
Brand: TRIDENT II

## 2021-12-01 DEVICE — TOTL HIP HI DEMAND STRYKER: Type: IMPLANTABLE DEVICE | Site: HIP | Status: FUNCTIONAL

## 2021-12-01 DEVICE — DEV CONTRL TISS STRATAFIX SPIRAL PDO BIDIR MO4 36X36CM: Type: IMPLANTABLE DEVICE | Site: HIP | Status: FUNCTIONAL

## 2021-12-01 DEVICE — CERAMIC V40 FEMORAL HEAD
Type: IMPLANTABLE DEVICE | Site: HIP | Status: FUNCTIONAL
Brand: BIOLOX

## 2021-12-01 DEVICE — WAX BONE HEMO AESCULAP 2.5GM: Type: IMPLANTABLE DEVICE | Site: HIP | Status: FUNCTIONAL

## 2021-12-01 DEVICE — 127 DEGREE NECK ANGLE HIP STEM
Type: IMPLANTABLE DEVICE | Site: HIP | Status: FUNCTIONAL
Brand: ACCOLADE

## 2021-12-01 DEVICE — 2CTX #2 PDO 36 X 36
Type: IMPLANTABLE DEVICE | Site: HIP | Status: FUNCTIONAL
Brand: STRATAFIX SPIRAL

## 2021-12-01 DEVICE — 6.5MM LOW PROFILE HEX SCREW 25MM
Type: IMPLANTABLE DEVICE | Site: HIP | Status: FUNCTIONAL
Brand: TRIDENT II

## 2021-12-01 RX ORDER — ONDANSETRON 4 MG/1
4 TABLET, FILM COATED ORAL EVERY 6 HOURS PRN
Status: CANCELLED | OUTPATIENT
Start: 2021-12-01

## 2021-12-01 RX ORDER — ACETAMINOPHEN 500 MG
1000 TABLET ORAL EVERY 8 HOURS
Qty: 42 TABLET | Refills: 0 | Status: SHIPPED | OUTPATIENT
Start: 2021-12-01

## 2021-12-01 RX ORDER — SODIUM CHLORIDE 0.9 % (FLUSH) 0.9 %
10 SYRINGE (ML) INJECTION AS NEEDED
Status: CANCELLED | OUTPATIENT
Start: 2021-12-01

## 2021-12-01 RX ORDER — OXYCODONE HYDROCHLORIDE 5 MG/1
5 TABLET ORAL EVERY 4 HOURS PRN
Status: CANCELLED | OUTPATIENT
Start: 2021-12-01 | End: 2021-12-11

## 2021-12-01 RX ORDER — ACETAMINOPHEN 500 MG
1000 TABLET ORAL ONCE
Status: COMPLETED | OUTPATIENT
Start: 2021-12-01 | End: 2021-12-01

## 2021-12-01 RX ORDER — MELOXICAM 15 MG/1
15 TABLET ORAL ONCE
Status: COMPLETED | OUTPATIENT
Start: 2021-12-01 | End: 2021-12-01

## 2021-12-01 RX ORDER — LABETALOL HYDROCHLORIDE 5 MG/ML
10 INJECTION, SOLUTION INTRAVENOUS EVERY 4 HOURS PRN
Status: CANCELLED | OUTPATIENT
Start: 2021-12-01

## 2021-12-01 RX ORDER — CEFAZOLIN SODIUM 2 G/100ML
2 INJECTION, SOLUTION INTRAVENOUS EVERY 8 HOURS
Status: CANCELLED | OUTPATIENT
Start: 2021-12-01 | End: 2021-12-02

## 2021-12-01 RX ORDER — ALPRAZOLAM 1 MG/1
1 TABLET ORAL NIGHTLY PRN
Status: CANCELLED | OUTPATIENT
Start: 2021-12-01 | End: 2021-12-08

## 2021-12-01 RX ORDER — FAMOTIDINE 10 MG/ML
20 INJECTION, SOLUTION INTRAVENOUS ONCE
Status: CANCELLED | OUTPATIENT
Start: 2021-12-01 | End: 2021-12-01

## 2021-12-01 RX ORDER — PREGABALIN 75 MG/1
75 CAPSULE ORAL ONCE
Status: COMPLETED | OUTPATIENT
Start: 2021-12-01 | End: 2021-12-01

## 2021-12-01 RX ORDER — ASPIRIN 325 MG
325 TABLET, DELAYED RELEASE (ENTERIC COATED) ORAL EVERY 12 HOURS SCHEDULED
Status: CANCELLED | OUTPATIENT
Start: 2021-12-02

## 2021-12-01 RX ORDER — MAGNESIUM HYDROXIDE 1200 MG/15ML
LIQUID ORAL AS NEEDED
Status: DISCONTINUED | OUTPATIENT
Start: 2021-12-01 | End: 2021-12-01 | Stop reason: HOSPADM

## 2021-12-01 RX ORDER — MELOXICAM 7.5 MG/1
15 TABLET ORAL DAILY
Status: CANCELLED | OUTPATIENT
Start: 2021-12-01

## 2021-12-01 RX ORDER — LIDOCAINE HYDROCHLORIDE 10 MG/ML
INJECTION, SOLUTION EPIDURAL; INFILTRATION; INTRACAUDAL; PERINEURAL AS NEEDED
Status: DISCONTINUED | OUTPATIENT
Start: 2021-12-01 | End: 2021-12-01 | Stop reason: SURG

## 2021-12-01 RX ORDER — PROPOFOL 10 MG/ML
VIAL (ML) INTRAVENOUS AS NEEDED
Status: DISCONTINUED | OUTPATIENT
Start: 2021-12-01 | End: 2021-12-01 | Stop reason: SURG

## 2021-12-01 RX ORDER — BUPIVACAINE HYDROCHLORIDE 5 MG/ML
INJECTION, SOLUTION PERINEURAL
Status: COMPLETED | OUTPATIENT
Start: 2021-12-01 | End: 2021-12-01

## 2021-12-01 RX ORDER — SODIUM CHLORIDE 9 MG/ML
100 INJECTION, SOLUTION INTRAVENOUS CONTINUOUS
Status: CANCELLED | OUTPATIENT
Start: 2021-12-01

## 2021-12-01 RX ORDER — BUPIVACAINE HCL/0.9 % NACL/PF 0.125 %
PLASTIC BAG, INJECTION (ML) EPIDURAL AS NEEDED
Status: DISCONTINUED | OUTPATIENT
Start: 2021-12-01 | End: 2021-12-01 | Stop reason: SURG

## 2021-12-01 RX ORDER — MEPERIDINE HYDROCHLORIDE 25 MG/ML
12.5 INJECTION INTRAMUSCULAR; INTRAVENOUS; SUBCUTANEOUS
Status: DISCONTINUED | OUTPATIENT
Start: 2021-12-01 | End: 2021-12-01 | Stop reason: HOSPADM

## 2021-12-01 RX ORDER — SODIUM CHLORIDE 0.9 % (FLUSH) 0.9 %
10 SYRINGE (ML) INJECTION EVERY 12 HOURS SCHEDULED
Status: CANCELLED | OUTPATIENT
Start: 2021-12-01

## 2021-12-01 RX ORDER — SODIUM CHLORIDE, SODIUM LACTATE, POTASSIUM CHLORIDE, CALCIUM CHLORIDE 600; 310; 30; 20 MG/100ML; MG/100ML; MG/100ML; MG/100ML
9 INJECTION, SOLUTION INTRAVENOUS CONTINUOUS
Status: DISCONTINUED | OUTPATIENT
Start: 2021-12-01 | End: 2021-12-01 | Stop reason: HOSPADM

## 2021-12-01 RX ORDER — ACETAMINOPHEN 500 MG
1000 TABLET ORAL EVERY 8 HOURS
Status: CANCELLED | OUTPATIENT
Start: 2021-12-01

## 2021-12-01 RX ORDER — NALOXONE HCL 0.4 MG/ML
0.1 VIAL (ML) INJECTION
Status: CANCELLED | OUTPATIENT
Start: 2021-12-01

## 2021-12-01 RX ORDER — FAMOTIDINE 20 MG/1
20 TABLET, FILM COATED ORAL ONCE
Status: COMPLETED | OUTPATIENT
Start: 2021-12-01 | End: 2021-12-01

## 2021-12-01 RX ORDER — HYDROMORPHONE HYDROCHLORIDE 1 MG/ML
0.5 INJECTION, SOLUTION INTRAMUSCULAR; INTRAVENOUS; SUBCUTANEOUS
Status: DISCONTINUED | OUTPATIENT
Start: 2021-12-01 | End: 2021-12-01 | Stop reason: HOSPADM

## 2021-12-01 RX ORDER — CEFAZOLIN SODIUM 2 G/100ML
2 INJECTION, SOLUTION INTRAVENOUS ONCE
Status: COMPLETED | OUTPATIENT
Start: 2021-12-01 | End: 2021-12-01

## 2021-12-01 RX ORDER — EPHEDRINE SULFATE 50 MG/ML
5 INJECTION, SOLUTION INTRAVENOUS ONCE AS NEEDED
Status: DISCONTINUED | OUTPATIENT
Start: 2021-12-01 | End: 2021-12-01 | Stop reason: HOSPADM

## 2021-12-01 RX ORDER — NALOXONE HCL 0.4 MG/ML
0.4 VIAL (ML) INJECTION AS NEEDED
Status: DISCONTINUED | OUTPATIENT
Start: 2021-12-01 | End: 2021-12-01 | Stop reason: HOSPADM

## 2021-12-01 RX ORDER — ONDANSETRON 2 MG/ML
INJECTION INTRAMUSCULAR; INTRAVENOUS AS NEEDED
Status: DISCONTINUED | OUTPATIENT
Start: 2021-12-01 | End: 2021-12-01 | Stop reason: SURG

## 2021-12-01 RX ORDER — ASPIRIN 325 MG
325 TABLET, DELAYED RELEASE (ENTERIC COATED) ORAL 2 TIMES DAILY
Qty: 60 TABLET | Refills: 0 | Status: SHIPPED | OUTPATIENT
Start: 2021-12-01 | End: 2021-12-31

## 2021-12-01 RX ORDER — SODIUM CHLORIDE, SODIUM LACTATE, POTASSIUM CHLORIDE, CALCIUM CHLORIDE 600; 310; 30; 20 MG/100ML; MG/100ML; MG/100ML; MG/100ML
100 INJECTION, SOLUTION INTRAVENOUS CONTINUOUS
Status: DISCONTINUED | OUTPATIENT
Start: 2021-12-01 | End: 2021-12-01 | Stop reason: HOSPADM

## 2021-12-01 RX ORDER — FENTANYL CITRATE 50 UG/ML
50 INJECTION, SOLUTION INTRAMUSCULAR; INTRAVENOUS
Status: DISCONTINUED | OUTPATIENT
Start: 2021-12-01 | End: 2021-12-01 | Stop reason: HOSPADM

## 2021-12-01 RX ORDER — OXYCODONE HYDROCHLORIDE 5 MG/1
5 TABLET ORAL EVERY 4 HOURS PRN
Qty: 40 TABLET | Refills: 0 | Status: SHIPPED | OUTPATIENT
Start: 2021-12-01 | End: 2022-02-28 | Stop reason: HOSPADM

## 2021-12-01 RX ORDER — MELOXICAM 15 MG/1
15 TABLET ORAL DAILY
Qty: 10 TABLET | Refills: 0 | Status: SHIPPED | OUTPATIENT
Start: 2021-12-01 | End: 2022-02-17

## 2021-12-01 RX ORDER — DEXAMETHASONE SODIUM PHOSPHATE 4 MG/ML
INJECTION, SOLUTION INTRA-ARTICULAR; INTRALESIONAL; INTRAMUSCULAR; INTRAVENOUS; SOFT TISSUE AS NEEDED
Status: DISCONTINUED | OUTPATIENT
Start: 2021-12-01 | End: 2021-12-01 | Stop reason: SURG

## 2021-12-01 RX ORDER — ONDANSETRON 2 MG/ML
4 INJECTION INTRAMUSCULAR; INTRAVENOUS EVERY 6 HOURS PRN
Status: CANCELLED | OUTPATIENT
Start: 2021-12-01

## 2021-12-01 RX ORDER — OXYCODONE HYDROCHLORIDE 5 MG/1
10 TABLET ORAL EVERY 4 HOURS PRN
Status: CANCELLED | OUTPATIENT
Start: 2021-12-01 | End: 2021-12-11

## 2021-12-01 RX ORDER — DOCUSATE SODIUM 100 MG/1
100 CAPSULE, LIQUID FILLED ORAL 2 TIMES DAILY
Qty: 60 CAPSULE | Refills: 0 | Status: SHIPPED | OUTPATIENT
Start: 2021-12-01 | End: 2022-02-17

## 2021-12-01 RX ORDER — ONDANSETRON 2 MG/ML
4 INJECTION INTRAMUSCULAR; INTRAVENOUS ONCE AS NEEDED
Status: DISCONTINUED | OUTPATIENT
Start: 2021-12-01 | End: 2021-12-01 | Stop reason: HOSPADM

## 2021-12-01 RX ORDER — MIDAZOLAM HYDROCHLORIDE 1 MG/ML
0.5 INJECTION INTRAMUSCULAR; INTRAVENOUS
Status: COMPLETED | OUTPATIENT
Start: 2021-12-01 | End: 2021-12-01

## 2021-12-01 RX ORDER — PANTOPRAZOLE SODIUM 40 MG/1
40 TABLET, DELAYED RELEASE ORAL EVERY MORNING
Status: DISCONTINUED | OUTPATIENT
Start: 2021-12-02 | End: 2021-12-01 | Stop reason: HOSPADM

## 2021-12-01 RX ORDER — LIDOCAINE HYDROCHLORIDE 10 MG/ML
0.5 INJECTION, SOLUTION EPIDURAL; INFILTRATION; INTRACAUDAL; PERINEURAL ONCE AS NEEDED
Status: COMPLETED | OUTPATIENT
Start: 2021-12-01 | End: 2021-12-01

## 2021-12-01 RX ADMIN — MELOXICAM 15 MG: 15 TABLET ORAL at 12:13

## 2021-12-01 RX ADMIN — FAMOTIDINE 20 MG: 20 TABLET ORAL at 12:13

## 2021-12-01 RX ADMIN — SODIUM CHLORIDE, POTASSIUM CHLORIDE, SODIUM LACTATE AND CALCIUM CHLORIDE 9 ML/HR: 600; 310; 30; 20 INJECTION, SOLUTION INTRAVENOUS at 11:55

## 2021-12-01 RX ADMIN — DEXAMETHASONE SODIUM PHOSPHATE 8 MG: 4 INJECTION, SOLUTION INTRA-ARTICULAR; INTRALESIONAL; INTRAMUSCULAR; INTRAVENOUS; SOFT TISSUE at 13:24

## 2021-12-01 RX ADMIN — PREGABALIN 75 MG: 75 CAPSULE ORAL at 12:12

## 2021-12-01 RX ADMIN — ONDANSETRON 4 MG: 2 INJECTION INTRAMUSCULAR; INTRAVENOUS at 15:05

## 2021-12-01 RX ADMIN — Medication 100 MCG: at 13:47

## 2021-12-01 RX ADMIN — LIDOCAINE HYDROCHLORIDE 0.2 ML: 10 INJECTION, SOLUTION EPIDURAL; INFILTRATION; INTRACAUDAL; PERINEURAL at 11:55

## 2021-12-01 RX ADMIN — MIDAZOLAM HYDROCHLORIDE 0.5 MG: 1 INJECTION, SOLUTION INTRAMUSCULAR; INTRAVENOUS at 12:38

## 2021-12-01 RX ADMIN — SODIUM CHLORIDE, POTASSIUM CHLORIDE, SODIUM LACTATE AND CALCIUM CHLORIDE: 600; 310; 30; 20 INJECTION, SOLUTION INTRAVENOUS at 14:37

## 2021-12-01 RX ADMIN — MIDAZOLAM HYDROCHLORIDE 0.5 MG: 1 INJECTION, SOLUTION INTRAMUSCULAR; INTRAVENOUS at 12:48

## 2021-12-01 RX ADMIN — CEFAZOLIN SODIUM 2 G: 2 INJECTION, SOLUTION INTRAVENOUS at 13:14

## 2021-12-01 RX ADMIN — PROPOFOL 30 MG: 10 INJECTION, EMULSION INTRAVENOUS at 13:28

## 2021-12-01 RX ADMIN — Medication 100 MCG: at 14:01

## 2021-12-01 RX ADMIN — LIDOCAINE HYDROCHLORIDE 20 MG: 10 INJECTION, SOLUTION EPIDURAL; INFILTRATION; INTRACAUDAL; PERINEURAL at 13:13

## 2021-12-01 RX ADMIN — PROPOFOL 80 MCG/KG/MIN: 10 INJECTION, EMULSION INTRAVENOUS at 13:20

## 2021-12-01 RX ADMIN — PROPOFOL 50 MG: 10 INJECTION, EMULSION INTRAVENOUS at 13:14

## 2021-12-01 RX ADMIN — BUPIVACAINE HYDROCHLORIDE 2 ML: 5 INJECTION, SOLUTION PERINEURAL at 13:16

## 2021-12-01 RX ADMIN — ACETAMINOPHEN 1000 MG: 500 TABLET ORAL at 12:12

## 2021-12-01 NOTE — DISCHARGE INSTRUCTIONS
"DISCHARGE INSTRUCTIONS   Dr. Osorio     Total Hip Replacement/Hip Hemiarthroplasty     Wound Care   1) Keep wound / incision area clean and dry.   2) Dressing to remain in place until post-operative day 7. Upon dressing removal, assess for wound drainage. If no drainage is present, keep wound / incision area open to air as much as possible. If drainage is present, place sterile dressing to cover wound and assess daily. If drainage continues to occur after post-operative day 14, call the office for an urgent appointment. (You should be seen in the clinic within 1-2 days of calling). DO NOT REMOVE SUTURES (IF PRESENT) UNDER ANY CIRCUMSTANCES PRIOR TO FOLLOW UP APPOINTMENT.  3) No baths or swimming until otherwise instructed. The wound must remain dry for 10 days after surgery. After 10 days, you may begin to shower only if no drainage is present. No submerging the wound under standing water until cleared by your physician (no baths, hot tubs, swimming pools, etc). Sponge baths are the best way to perform personal hygiene while at the same time protecting the wound from moisture.   4) Prior to showering, the wound must remain dry for 72 consecutive hours (no drainage whatsoever) prior to showering. If the wound drains or spots, the clock \"resets\" - make sure the wound has been drainage-free for 72 consecutive hours.   5) Once you are allowed to get the wound wet, please use gentle soap to wash the wound area. DO NOT aggressively scrub the wound with a washcloth or bath sponge. Please visually inspect your wound(s) at least once daily. If the wound(s) are in a difficult to see location, please use a mirror or have someone else assist with visual inspection.   6) No scrubbing the wound. You may \"pad dry\" the wound, but do not rub, as this may open up the wound and pre-dispose to wound infection.   7) Do not apply lotions or creams to incision site, unless instructed otherwise.   8) Observe for redness, swelling, or " drainage. Please call the clinic immediately if you have fevers, chills with warmth/redness surrounding wound site or if you notice pus drainage from the wound site     Activity   No heavy lifting objects greater than 10 pounds.   No driving while on narcotic pain medication.   No submerging wound under standing water (pool, bath tub, etc.) until otherwise instructed.   You may be protected weightbearing as tolerated on your operative (left lower) extremity   Use a walker for ambulation for at least 2 weeks after surgery.  May wean from walker after 2 weeks if approved by your therapist.   Posterior hip precautions for 6 weeks: No bending the hip past 90 degrees. Do not allow the leg to cross the midline of your body (adduction). No twisting motions. Ask your physical therapist to review these precautions with you.     Blood Clot Prophylaxis   (Aspirin vs. Lovenox vs. Eliquis administration is determined by your surgeon and tailored to your specific risk profile. You will be discharged with one of these medications.) You will need to complete a total 4 week course of enteric coated aspirin 325 mg (or 81mg) twice daily or Eliquis 2.5mg twice daily, in order to minimize your risk of blood clots following surgery. You will be supplied with a prescription to obtain this. Alternatively, you will need to compete a total 2 week course of Lovenox after surgery (followed by a 2 week course of aspirin twice daily), in order to minimize the risk of blood clots following surgery. Lovenox requires a single shot in the abdomen, to be taken once daily. You will be supplied with the prescription to obtain this. Prior to your discharge from the hospital, the nursing staff will instruct you on self-administration of the Lovenox, if you will be returning directly home from the hospital.     Discharge Pain Medications   You will be given a prescription for pain medication. You should start taking this the same day after your surgery.  "Wean off as tolerated. Do not wait to take the pain medication until the pain is severe, as it will be difficult to \"catch up\" once this occurs. The pain medication usually reaches its full effect ~1 hour after ingesting. If you have been sent home on Valium, this medication works well for muscle spasms. If you have been sent home on Colace, this medication should be taken until you are off all narcotic (i.e. Norco, Percocet, Oxycodone, etc) pain medications, in order to prevent constipation. Percocet or Norco have Tylenol in their ingredient lists. You must be careful not to exceed 4,000mg (4 grams) of Tylenol, from all sources, within a single 24-hr period. This means that you may not take more than 12 Percocets or Norcos within a 24-hr period. Do NOT take Regular or Extra Strength Tylenol when taking your Percocet or Norco medications.  If you have been sent home with a combination of oxycodone and Tylenol, please take Tylenol as scheduled.  The oxycodone is to be taken as needed for \"breakthrough\" pain.  Some common side effects of the narcotic pain medications (Percocet, Oxycodone, Norco, etc) include nausea and itching. Benadryl is a great over the counter medication that helps calm your stomach, decreases your anxiety levels, and minimizes the itching. You can easily purchase this at your local pharmacy as an over-the-counter medication. Please abide by the instructions as printed on the bottle. If your nausea persists, make sure to take small amounts of crackers or other lighter foods.     Follow-Up   Follow-up with Dr. Osorio's office in 3 weeks from the surgery date for a post-operative evaluation. Have the following xrays done upon arrival to the follow-up appointment: AP pelvis. Please call Dr. Osorio's office at [phone] for orthopaedic appointments or questions.  "

## 2021-12-01 NOTE — ANESTHESIA PROCEDURE NOTES
Spinal Block      Patient reassessed immediately prior to procedure    Patient location during procedure: OR  Indication:at surgeon's request  Performed By  CRNA: Hieu Glover CRNA  Preanesthetic Checklist  Completed: patient identified, IV checked, site marked, risks and benefits discussed, surgical consent, monitors and equipment checked, pre-op evaluation and timeout performed  Spinal Block Prep:  Patient Position:sitting  Sterile Tech:cap, gloves, sterile barriers and mask  Prep:Chloraprep  Patient Monitoring:blood pressure monitoring, continuous pulse oximetry and EKG  Spinal Block Procedure  Approach:midline  Guidance:landmark technique and palpation technique  Location:L4-L5  Needle Type:Sprotte  Needle Gauge:25 G  Placement of Spinal needle event:cerebrospinal fluid aspirated  Paresthesia: no  Fluid Appearance:clear  Medications: bupivacaine (MARCAINE) 0.5 % injection, 2 mL  Med Administered at 12/1/2021 1:16 PM   Post Assessment  Patient Tolerance:patient tolerated the procedure well with no apparent complications  Complications no  Additional Notes  Procedure:  Pt assisted to sitting position, with legs in position of comfort over side of bed.  Pt. instructed in optimal spine presentation, the spine was prepped/ Draped and the skin at insertion site was anesthetized with 1% Lidocaine 2 ml.  The spinal needle was then advanced until CSF flow was obtained and LA was injected:

## 2021-12-01 NOTE — THERAPY EVALUATION
Patient Name: Adonay Eason  : 1950    MRN: [id number]                              Today's Date: 2021       Admit Date: 2021    Visit Dx:     ICD-10-CM ICD-9-CM   1. Status post total hip replacement, left  Z96.642 V43.64   2. Primary osteoarthritis of left hip  M16.12 715.15     Patient Active Problem List   Diagnosis   • Primary osteoarthritis of left hip   • Anxiety   • DELANO (obstructive sleep apnea)   • Elevated hemoglobin A1c     Past Medical History:   Diagnosis Date   • Arthritis    • Chronic pain in right shoulder    • TENA (generalized anxiety disorder)    • GERD (gastroesophageal reflux disease)    • Insomnia    • Migraines    • Panic attacks    • RLS (restless legs syndrome)    • Seasonal allergies    • Wears glasses     reading glasses     Past Surgical History:   Procedure Laterality Date   • ANAL FISTULA REPAIR     • CERVICAL DISCECTOMY ANTERIOR     • COLONOSCOPY     • KNEE CARTILAGE SURGERY Right    • KNEE SURGERY Left    • LUMBAR DISCECTOMY     • SHOULDER ROTATOR CUFF REPAIR Right       General Information     Row Name 21 1655          Physical Therapy Time and Intention    Document Type evaluation  -CHARLETTE     Mode of Treatment individual therapy; physical therapy  -CHARLETTE     Row Name 21 1655          General Information    Patient Profile Reviewed yes  -CHARLETTE     Prior Level of Function min assist:; all household mobility; transfer; bed mobility; ADL's  -CHARLETTE     Existing Precautions/Restrictions left; hip, posterior  -CHARLETTE     Barriers to Rehab none identified  -CHARLETTE     Row Name 21 1655          Living Environment    Lives With spouse  -CHARLETTE     Row Name 21 1655          Home Main Entrance    Number of Stairs, Main Entrance two  -CHARLETTE     Stair Railings, Main Entrance railing on left side (ascending)  -CHARLETTE     Row Name 21 1655          Stairs Within Home, Primary    Stairs, Within Home, Primary 0  -CHARLETTE     Number of Stairs, Within Home,  Primary none  -CHARLETTE     Row Name 12/01/21 1655          Cognition    Orientation Status (Cognition) oriented x 4  -CHARLETTE     Row Name 12/01/21 1655          Safety Issues, Functional Mobility    Safety Issues Affecting Function (Mobility) safety precaution awareness; safety precautions follow-through/compliance  -     Impairments Affecting Function (Mobility) endurance/activity tolerance; strength; range of motion (ROM)  -           User Key  (r) = Recorded By, (t) = Taken By, (c) = Cosigned By    Initials Name Provider Type    CHARLETTE Elliot Alexandre PT Physical Therapist               Mobility     Row Name 12/01/21 1655          Bed Mobility    Bed Mobility scooting/bridging; supine-sit; sit-supine  -CHARLETTE     Scooting/Bridging [place] (Bed Mobility) supervision; verbal cues  -CHARLETTE     Supine-Sit [place] (Bed Mobility) supervision; verbal cues  -CHARLETTE     Sit-Supine [place] (Bed Mobility) supervision; verbal cues  -CHARLETTE     Assistive Device (Bed Mobility) bed rails; head of bed elevated  -     Comment (Bed Mobility) Verbal cues for LE sequencing off of/onto EOB and trunk control into sitting/supine  -     Row Name 12/01/21 1655          Transfers    Comment (Transfers) Verbal cues for safe hand placement during standing/sitting and moving L LE out for comfort prior to sitting  -     Row Name 12/01/21 1655          Sit-Stand Transfer    Sit-Stand [place] (Transfers) verbal cues; contact guard  -     Assistive Device (Sit-Stand Transfers) walker, front-wheeled  -     Row Name 12/01/21 1655          Gait/Stairs (Locomotion)    [place] Level (Gait) verbal cues; contact guard; 1 person to manage equipment  -     Assistive Device (Gait) walker, front-wheeled  -     Distance in Feet (Gait) 350  -CHARLETTE     Deviations/Abnormal Patterns (Gait) bilateral deviations; josafat decreased; gait speed decreased; stride length decreased  -     Bilateral Gait Deviations forward flexed posture  -     Left Sided  Gait Deviations heel strike decreased; weight shift ability decreased  -     [place] Level (Stairs) verbal cues; contact guard  -     Handrail Location (Stairs) left side (ascending)  -     Number of Steps (Stairs) 2  -CHARLETTE     Ascending Technique (Stairs) step-to-step  -CHARLETTE     Descending Technique (Stairs) step-to-step  -CHARLETTE     Comment (Gait/Stairs) Pt ambulated with step through pattern and decreased speed. Verbal cues for maintaining upright posture, body within walker, increase step length, and WB through LEs. Pt ascended/descended 2 steps using L ascending HR, R HHA, and CGA. Gait/stair training limited by fatigue. No knee buckling noted.  -     Row Name 12/01/21 1655          Mobility    Extremity Weight-bearing Status left lower extremity  -     Left Lower Extremity (Weight-bearing Status) weight-bearing as tolerated (WBAT)  -           User Key  (r) = Recorded By, (t) = Taken By, (c) = Cosigned By    Initials Name Provider Type    Elliot Lynch, PT Physical Therapist               Obj/Interventions     [facility] Name 12/01/21 1655          Range of Motion Comprehensive    General Range of Motion lower extremity range of motion deficits identified  -     Comment, General Range of Motion R LE AROM WFL; L LE AROM impaired 25%; able to actively DF/PF  -[facility] Name 12/01/21 1655          Strength Comprehensive (MMT)    General Manual Muscle Testing (MMT) Assessment lower extremity strength deficits identified  -     Comment, General Manual Muscle Testing (MMT) Assessment R LE functionally 4+/5; L LE functionally 4-/5;  -     Row Name 12/01/21 1655          Motor Skills    Therapeutic Exercise hip; knee; ankle  -[facility] Name 12/01/21 1655          Hip (Therapeutic Exercise)    Hip (Therapeutic Exercise) isometric exercises  -     Hip Isometrics (Therapeutic Exercise) gluteal sets; 10 repetitions  -     Row Name 12/01/21 1655          Knee (Therapeutic Exercise)    Knee (Therapeutic  Exercise) isometric exercises  -     Knee Isometrics (Therapeutic Exercise) quad sets; 10 repetitions  -     Row Name 12/01/21 1655          Ankle (Therapeutic Exercise)    Ankle (Therapeutic Exercise) AROM (active range of motion)  -     Ankle AROM (Therapeutic Exercise) bilateral; dorsiflexion; plantarflexion; 10 repetitions  -     Row Name 12/01/21 1655          Sensory Assessment (Somatosensory)    Sensory Assessment (Somatosensory) LE sensation intact  -           User Key  (r) = Recorded By, (t) = Taken By, (c) = Cosigned By    Initials Name Provider Type    CHARLETTE Elliot Alexandre, PT Physical Therapist               Goals/Plan     Row Name 12/01/21 1655          Bed Mobility Goal 1 (PT)    Activity/Assistive Device (Bed Mobility Goal 1, PT) sit to supine/supine to sit  -CHARLETTE     [place] Level/Cues Needed (Bed Mobility Goal 1, PT) modified independence  -CHARLETTE     Time Frame (Bed Mobility Goal 1, PT) long term goal (LTG); 3 days  -     Row Name 12/01/21 1655          Transfer Goal 1 (PT)    Activity/Assistive Device (Transfer Goal 1, PT) sit-to-stand/stand-to-sit; walker, rolling  -CHARLETTE     [place] Level/Cues Needed (Transfer Goal 1, PT) modified independence  -CHARLETTE     Time Frame (Transfer Goal 1, PT) long term goal (LTG); 3 days  -     Row Name 12/01/21 1655          Gait Training Goal 1 (PT)    Activity/Assistive Device (Gait Training Goal 1, PT) gait (walking locomotion); walker, rolling  -CHARLETTE     [place] Level (Gait Training Goal 1, PT) modified independence  -CHARLETTE     Distance (Gait Training Goal 1, PT) 500 feet  -CHARLETTE     Time Frame (Gait Training Goal 1, PT) long term goal (LTG); 3 days  -     Row Name 12/01/21 1655          Stairs Goal 1 (PT)    Activity/Assistive Device (Stairs Goal 1, PT) stairs, all skills; using handrail, left; cane, straight  -CHARLETTE     [place] Level/Cues Needed (Stairs Goal 1, PT) modified independence  -CHARLETTE     Number of Stairs (Stairs Goal 1, PT) 2  -CHARLETTE     Time Frame  (Stairs Goal 1, PT) long term goal (LTG); 3 days  -           User Key  (r) = Recorded By, (t) = Taken By, (c) = Cosigned By    Initials Name Provider Type    Elliot Lynch, MARY LOU Physical Therapist               Clinical Impression     Row Name 12/01/21 1655          Pain    Additional Documentation Pain Scale: Numbers Pre/Post-Treatment (Group)  -     Row Name 12/01/21 1655          Pain Scale: Numbers Pre/Post-Treatment    Pretreatment Pain Rating 0/10 - no pain  -CHARLETTE     Posttreatment Pain Rating 0/10 - no pain  -     Row Name 12/01/21 1655          Therapy Assessment/Plan (PT)    Patient/Family Therapy Goals Statement (PT) To return home  -CHARLETTE     Rehab Potential (PT) good, to achieve stated therapy goals  -     Criteria for Skilled Interventions Met (PT) yes; meets criteria; skilled treatment is necessary  -     Row Name 12/01/21 1655          Positioning and Restraints    Pre-Treatment Position in bed  -CHARLETTE     Post Treatment Position bed  -CHARLETTE     In Bed notified nsg; fowlers; call light within reach; encouraged to call for assist; exit alarm on; with nsg  -CHARLETTE           User Key  (r) = Recorded By, (t) = Taken By, (c) = Cosigned By    Initials Name Provider Type    Elliot Lynch PT Physical Therapist               Outcome Measures     Row Name 12/01/21 1655          How much help from another person do you currently need...    Turning from your back to your side while in flat bed without using bedrails? 4  -CHARLETTE     Moving from lying on back to sitting on the side of a flat bed without bedrails? 4  -CHARLETTE     Moving to and from a bed to a chair (including a wheelchair)? 3  -CHARLETTE     Standing up from a chair using your arms (e.g., wheelchair, bedside chair)? 3  -CHARLETTE     Climbing 3-5 steps with a railing? 3  -CHARLETTE     To walk in hospital room? 3  -CHARLETTE     AM-PAC 6 Clicks Score (PT) 20  -CHARLETTE     Row Name 12/01/21 1655          PADD    Diagnosis 2  -CHARLETTE     Gender 2  -CHARLETTE     Age Group 1  -CHARLETTE     Gait Distance 1  -CHARLETTE      Assist Level 1  -     Home Support 3  -     PADD Score 10  -     Patient Preference home with home health  -     Prediction by PADD Score directly home (with home health or out-patient rehab)  -     Row Name 12/01/21 1655          Functional Assessment    Outcome Measure Options AM-PAC 6 Clicks Basic Mobility (PT); PADD  -           User Key  (r) = Recorded By, (t) = Taken By, (c) = Cosigned By    Initials Name Provider Type    CHARLETTE Elliot Alexandre, PT Physical Therapist                             Physical Therapy Education                 Title: PT OT SLP Therapies (Done)     Topic: Physical Therapy (Done)     Point: Mobility training (Done)     Learning Progress Summary           Patient Acceptance, E,D,H, VU by CHARLETTE at 12/1/2021 1655    Comment: Educated on safe sequencing with bed mobility, ambulatory/car transfers, gait, and stair training. Reviewed HEP and hip precautions via handout.                   Point: Home exercise program (Done)     Learning Progress Summary           Patient Acceptance, E,D,H, VU by CHARLETTE at 12/1/2021 1655    Comment: Educated on safe sequencing with bed mobility, ambulatory/car transfers, gait, and stair training. Reviewed HEP and hip precautions via handout.                   Point: Body mechanics (Done)     Learning Progress Summary           Patient Acceptance, E,D,H, VU by CHARLETTE at 12/1/2021 1655    Comment: Educated on safe sequencing with bed mobility, ambulatory/car transfers, gait, and stair training. Reviewed HEP and hip precautions via handout.                   Point: Precautions (Done)     Learning Progress Summary           Patient Acceptance, E,D,H, VU by CHARLETTE at 12/1/2021 1655    Comment: Educated on safe sequencing with bed mobility, ambulatory/car transfers, gait, and stair training. Reviewed HEP and hip precautions via handout.                               User Key     Initials Effective Dates Name Provider Type Discipline    CHARLETTE 06/16/21 -  Elliot Alexandre, PT Physical  Therapist PT              PT Recommendation and Plan  Planned Therapy Interventions (PT): balance training, bed mobility training, gait training, home exercise program, patient/family education, transfer training, stair training, strengthening  Plan of Care Reviewed With: patient  Progress: improving  Outcome Summary: PT eval complete. Pt ambulated 350 feet using RW, CGA, and one person to manage equipment. Pt ascended/descended 2 steps using L ascending HR, R HHA, and CGA. Gait/stair training limited by fatigue. Bed mobility performed with supervision and STS with CGA. No knee buckling noted. Pt IND with SLR. Reviewed HEP and hip precautions via handout. Educated on safe car transfers and use of adaptive equipment for ADLs. ADLs assessed, pt does not require OT eval tonight. Functionally, pt safe to d/c home with assist today from a PT perspective. Recommend HHPT.     Time Calculation:    PT Charges     Row Name 12/01/21 1655             Time Calculation    Start Time 1655  -CHARLETTE      PT Received On 12/01/21  -CHARLETTE      PT Goal Re-Cert Due Date 12/11/21  -CHARLETTE              Time Calculation- PT    Total Timed Code Minutes- PT 10 minute(s)  -CHARLETTE              Timed Charges    30415 - PT Therapeutic Exercise Minutes 2  -CHARLETTE      19192 - Gait Training Minutes  8  -CHARLETTE              Untimed Charges    PT Eval/Re-eval Minutes 35  -CHARLETTE              Total Minutes    Timed Charges Total Minutes 10  -CHARLETTE      Untimed Charges Total Minutes 35  -CHARLETTE       Total Minutes 45  -CHARLETTE            User Key  (r) = Recorded By, (t) = Taken By, (c) = Cosigned By    Initials Name Provider Type    CHARLETTE Elliot Alexandre, PT Physical Therapist              Therapy Charges for Today     Code Description Service Date Service Provider Modifiers Qty    10143392436 HC GAIT TRAINING EA 15 MIN 12/1/2021 Elliot Alexandre, PT GP 1    11992934559 HC PT EVAL LOW COMPLEXITY 3 12/1/2021 Elliot Alexandre, PT GP 1    11170404637 HC PT THER SUPP EA 15 MIN 12/1/2021 Elliot Alexandre, PT GP 3           PT G-Codes  Outcome Measure Options: AM-PAC 6 Clicks Basic Mobility (PT), PADD  AM-PAC 6 Clicks Score (PT): 20    Elliot Alexandre, PT  12/1/2021

## 2021-12-01 NOTE — PLAN OF CARE
Problem: Adult Inpatient Plan of Care  Goal: Plan of Care Review  Flowsheets (Taken 12/1/2021 5650)  Progress: improving  Plan of Care Reviewed With: patient  Outcome Summary: PT eval complete. Pt ambulated 350 feet using RW, CGA, and one person to manage equipment. Pt ascended/descended 2 steps using L ascending HR, R HHA, and CGA. Gait/stair training limited by fatigue. Bed mobility performed with supervision and STS with CGA. No knee buckling noted. Pt IND with SLR. Reviewed HEP and hip precautions via handout. Educated on safe car transfers and use of adaptive equipment for ADLs. ADLs assessed, pt does not require OT eval tonight. Functionally, pt safe to d/c home with assist today from a PT perspective. Recommend HHPT.   Goal Outcome Evaluation:  Plan of Care Reviewed With: patient        Progress: improving  Outcome Summary: PT eval complete. Pt ambulated 350 feet using RW, CGA, and one person to manage equipment. Pt ascended/descended 2 steps using L ascending HR, R HHA, and CGA. Gait/stair training limited by fatigue. Bed mobility performed with supervision and STS with CGA. No knee buckling noted. Pt IND with SLR. Reviewed HEP and hip precautions via handout. Educated on safe car transfers and use of adaptive equipment for ADLs. ADLs assessed, pt does not require OT eval tonight. Functionally, pt safe to d/c home with assist today from a PT perspective. Recommend HHPT.

## 2021-12-01 NOTE — H&P
Pre-Op H&P  Adonay Eason  [id number]  1950    Chief complaint: Left hip pain    HPI:    Patient is a 70 y.o.male who presents with a history of left hip pain. He has been diagnosed with primary osteoarthritis of the left hip. Conservative treatment has failed to provide significant relief. Surgical intervention is recommended and he is agreeable. He is here today for a left total hip arthroplasty.     Review of Systems:  General ROS: negative for chills, fever or skin lesions;  No changes since last office visit.  Neg for recent sick exposure  Cardiovascular ROS: no chest pain or dyspnea on exertion  Respiratory ROS: no cough, shortness of breath, or wheezing; +DELANO, former cigarette smoker (1 ppd x 10 years)- quit 1980    Allergies: No Known Allergies    Home Meds:    No current facility-administered medications on file prior to encounter.     Current Outpatient Medications on File Prior to Encounter   Medication Sig Dispense Refill   • ALPRAZolam (XANAX) 1 MG tablet Take 1-2 mg by mouth At Night As Needed for Anxiety or Sleep.     • Chlorhexidine Gluconate (Antiseptic Skin Cleanser) 4 % solution Shower daily with solution as directed 5 days prior to surgery. 237 mL 0   • esomeprazole (nexIUM) 40 MG capsule Take 40 mg by mouth Every Morning Before Breakfast.     • Ibuprofen 200 MG capsule Take  by mouth As Needed.     • mupirocin (BACTROBAN) 2 % ointment Apply pea-sized amount to each nostril twice daily for 5 days prior to surgery 22 g 0   • naproxen sodium (ALEVE) 220 MG tablet Take 220 mg by mouth As Needed.     • pramipexole (MIRAPEX) 1.5 MG tablet Take 1.5 mg by mouth 3 (Three) Times a Day.         PMH:   Past Medical History:   Diagnosis Date   • Arthritis    • Chronic pain in right shoulder    • TENA (generalized anxiety disorder)    • GERD (gastroesophageal reflux disease)    • Insomnia    • Migraines    • DELANO (obstructive sleep apnea)    • Panic attacks    • RLS (restless legs syndrome)    •  Seasonal allergies    • Wears glasses      PSH:    Past Surgical History:   Procedure Laterality Date   • ANAL FISTULA REPAIR  2009   • CERVICAL DISCECTOMY ANTERIOR  2000   • COLONOSCOPY  2009   • KNEE CARTILAGE SURGERY Right    • KNEE SURGERY Left 2000   • LUMBAR DISCECTOMY  2011   • SHOULDER ROTATOR CUFF REPAIR Right 2019       Social History:   Tobacco:   Social History     Tobacco Use   Smoking Status Former Smoker   • Packs/day: 1.00   • Years: 10.00   • Pack years: 10.00   • Types: Cigarettes   • Quit date:    • Years since quittin.9   Smokeless Tobacco Never Used      Alcohol:     Social History     Substance and Sexual Activity   Alcohol Use Not Currently    Comment: QUIT 2+ YRS AGO       Physical Exam:  General Appearance:    Alert, cooperative, no distress, appears stated age   Head:    Normocephalic, without obvious abnormality, atraumatic   Lungs:     Clear to auscultation bilaterally, respirations unlabored    Heart:   Regular rate and rhythm, S1 and S2 normal, no murmur, rub    or gallop    Abdomen:    Soft, non-tender, +bowel sounds   Breast Exam:    deferred   Genitalia:    deferred   Extremities:   Extremities normal, atraumatic, no cyanosis or edema   Skin:   Skin color, texture, turgor normal, no rashes or lesions   Neurologic:   Grossly intact   Results Review  LABS:  Lab Results   Component Value Date    WBC 6.85 2021    HGB 15.7 2021    HCT 46.9 2021    MCV 90.7 2021     2021    NEUTROABS 3.70 2021    GLUCOSE 98 2021    BUN 18 2021    CREATININE 1.18 2021    EGFRIFNONA 61 2021     2021    K 4.6 2021     2021    CO2 27.0 2021    CALCIUM 9.4 2021    ALBUMIN 4.30 2019    AST 37 2020    ALT 28 2020    BILITOT 0.4 2019    PTT 32.7 2021    INR 1.06 2021       RADIOLOGY:  No radiology results for the last 3 days     I reviewed the patient's new  clinical results.    Cancer Staging (if applicable)  Cancer Patient: __ yes _X_no __unknown; If yes, clinical stage T:__ N:__M:__, stage group or __N/A    Impression: Primary osteoarthritis of the left hip    Plan: Left total hip arthroplasty      Carol Segovia, SABI   12/01/21   11:56 AM EST

## 2021-12-01 NOTE — H&P
Patient Name: Adonay Eason  MRN: [id number]  : 1950  DOS: 2021    Attending: Omi Osorio MD    Primary Care Provider: Jeffery Hester MD      Chief complaint: Left hip pain    Subjective   Patient is a pleasant 70 y.o. male presented for scheduled surgery by Dr. Osorio.  He anticipates left hip replacement today.  His hip has been painful for about 3 years.  He tried physical therapy with minimal benefit.  He denies use of assistive device for ambulation or recent falls.    When seen preop he is very anxious.  He denies pain.  He denies nausea, shortness of breath or chest pain.  No history of DVT or PE.    Allergies:  No Known Allergies    Meds:  Medications Prior to Admission   Medication Sig Dispense Refill Last Dose   • Chlorhexidine Gluconate (Antiseptic Skin Cleanser) 4 % solution Shower daily with solution as directed 5 days prior to surgery. 237 mL 0 2021 at Unknown time   • diphenhydrAMINE HCl (ZzzQuil) 50 MG/30ML liquid Take  by mouth At Night As Needed (SLEEP).   2021 at Unknown time   • mupirocin (BACTROBAN) 2 % ointment Apply pea-sized amount to each nostril twice daily for 5 days prior to surgery 22 g 0 2021 at Unknown time   • pramipexole (MIRAPEX) 1.5 MG tablet Take 1.5 mg by mouth 3 (Three) Times a Day.   2021 at Unknown time   • ALPRAZolam (XANAX) 1 MG tablet Take 1-2 mg by mouth At Night As Needed for Anxiety or Sleep.   2021   • esomeprazole (nexIUM) 40 MG capsule Take 40 mg by mouth Every Morning Before Breakfast.   2021   • Ibuprofen 200 MG capsule Take  by mouth As Needed.      • Naproxen Sod-diphenhydrAMINE (Aleve PM) 220-25 MG tablet Take 1 tablet by mouth Every Night. WILL STOP FOR UPCOMING PROCEDURE   2021   • naproxen sodium (ALEVE) 220 MG tablet Take 220 mg by mouth As Needed.            History:   Past Medical History:   Diagnosis Date   • Arthritis    • Chronic pain in right shoulder    • TENA (generalized anxiety  "disorder)    • GERD (gastroesophageal reflux disease)    • Insomnia    • Migraines    • Panic attacks    • RLS (restless legs syndrome)    • Seasonal allergies    • Wears glasses     reading glasses     Past Surgical History:   Procedure Laterality Date   • ANAL FISTULA REPAIR     • CERVICAL DISCECTOMY ANTERIOR  2000   • COLONOSCOPY     • KNEE CARTILAGE SURGERY Right    • KNEE SURGERY Left 2000   • LUMBAR DISCECTOMY  2011   • SHOULDER ROTATOR CUFF REPAIR Right 2019     Family History   Problem Relation Age of Onset   • Dementia Mother    • Cholecystitis Father      Social History     Tobacco Use   • Smoking status: Former Smoker     Packs/day: 1.00     Years: 10.00     Pack years: 10.00     Types: Cigarettes     Quit date:      Years since quittin.9   • Smokeless tobacco: Never Used   Vaping Use   • Vaping Use: Never used   Substance Use Topics   • Alcohol use: Not Currently     Comment: QUIT 2+ YRS AGO   • Drug use: No   He is  with 1 living child and 1 .  He is retired from [employer].    Review of Systems  Pertinent items are noted in HPI, all other systems reviewed and negative    Vital Signs  /94 (BP Location: Right arm, Patient Position: Sitting)   Pulse 89   Temp 97.5 °F (36.4 °C) (Temporal)   Resp 18   Ht 181.6 cm (71.5\")   Wt 99.8 kg (220 lb)   SpO2 97%   BMI 30.26 kg/m²     Physical Exam:    General Appearance:    Alert, cooperative, in no acute distress   Head:    Normocephalic, without obvious abnormality, atraumatic   Eyes:            Lids and lashes normal, conjunctivae and sclerae normal, no   icterus, no pallor, corneas clear,    Ears:    Ears appear intact with no abnormalities noted   Throat:   No oral lesions, no thrush, oral mucosa moist   Neck:   No adenopathy, supple, trachea midline, no thyromegaly    Lungs:     Clear to auscultation,respirations regular, even and unlabored    Heart:    Regular rhythm and normal rate, normal S1 and S2, no murmur, no " gallop   Abdomen:     Normal bowel sounds, no masses, no organomegaly, soft non-tender, non-distended, no guarding, no rebound  tenderness   Genitalia:    Deferred   Extremities:   Moves all extremities well, no edema, no cyanosis, no  redness   Pulses:   Pulses palpable and equal bilaterally   Skin:   No bleeding, bruising or rash   Neurologic:   Cranial nerves 2 - 12 grossly intact. Flexion and dorsiflexion intact bilateral feet.        I reviewed the patient's new clinical results.       Lab Results   Component Value Date    HGBA1C 5.70 (H) 11/18/2021     Results for SOUMYA FARRELL (MRN [id number]) as of 12/1/2021 15:11   Ref. Range 11/18/2021 11:52   Glucose Latest Ref Range: 65 - 99 mg/dL 98   Sodium Latest Ref Range: 136 - 145 mmol/L 141   Potassium Latest Ref Range: 3.5 - 5.2 mmol/L 4.6   CO2 Latest Ref Range: 22.0 - 29.0 mmol/L 27.0   Chloride Latest Ref Range: 98 - 107 mmol/L 104   Anion Gap Latest Ref Range: 5.0 - 15.0 mmol/L 10.0   Creatinine Latest Ref Range: 0.76 - 1.27 mg/dL 1.18   BUN Latest Ref Range: 8 - 23 mg/dL 18   BUN/Creatinine Ratio Latest Ref Range: 7.0 - 25.0  15.3   Calcium Latest Ref Range: 8.6 - 10.5 mg/dL 9.4   eGFR Non  Am Latest Ref Range: >60 mL/min/1.73 61   Hemoglobin A1C Latest Ref Range: 4.80 - 5.60 % 5.70 (H)   Protime Latest Ref Range: 11.4 - 14.4 Seconds 13.4   INR Latest Ref Range: 0.85 - 1.16  1.06   PTT Latest Ref Range: 22.0 - 39.0 seconds 32.7   WBC Latest Ref Range: 3.40 - 10.80 10*3/mm3 6.85   RBC Latest Ref Range: 4.14 - 5.80 10*6/mm3 5.17   Hemoglobin Latest Ref Range: 13.0 - 17.7 g/dL 15.7   Hematocrit Latest Ref Range: 37.5 - 51.0 % 46.9   RDW Latest Ref Range: 12.3 - 15.4 % 13.4   MCV Latest Ref Range: 79.0 - 97.0 fL 90.7   MCH Latest Ref Range: 26.6 - 33.0 pg 30.4   MCHC Latest Ref Range: 31.5 - 35.7 g/dL 33.5   MPV Latest Ref Range: 6.0 - 12.0 fL 10.8   Platelets Latest Ref Range: 140 - 450 10*3/mm3 194       Assessment and Plan:     Primary  osteoarthritis of left hip    Anxiety    DELANO (obstructive sleep apnea)    Elevated hemoglobin A1c      Plan  1. PT/OT- WBAT LLE  2. Pain control-prns   3. IS-encourage  4. DVT proph- Mechs/ASA  5. Bowel regimen  6. Resume home medications as appropriate  7. Monitor post-op labs  8. DC planning for home    Anxiety  -Continue home Xanax    DELANO  -CPAP if needed    Elevated hemoglobin A1c: In prediabetic range  - Explained to patient implication of A1C elevation, need to modify diet and increase activity, and f/u with PCP.      Ruth Sethi, APRN  12/01/21  15:11 EST

## 2021-12-01 NOTE — BRIEF OP NOTE
TOTAL HIP ARTHROPLASTY  Progress Note    Adonay Eason  12/1/2021    Pre-op Diagnosis:   Primary osteoarthritis of left hip [M16.12]       Post-Op Diagnosis Codes:     * Primary osteoarthritis of left hip [M16.12]    Procedure/CPT® Codes:  HI TOTAL HIP ARTHROPLASTY [59551]      Procedure(s):  TOTAL HIP ARTHROPLASTY LEFT    Surgeon(s):  Omi Osorio MD    Anesthesia: Spinal    Staff:   Circulator: Feli Syed RN; Anibal Hester RN; Mireya Ellison RN  Physician Assistant: Paloma Banerjee PA  Scrub Person: Lis Null  Nursing Assistant: Nava Franklin         Estimated Blood Loss: 300 mL    Urine Voided: * No values recorded between 12/1/2021  1:10 PM and 12/1/2021  2:52 PM *    Specimens:                None          Drains: * No LDAs found *    Findings: End-stage osteoarthritis left hip    Complications: None apparent          Omi Osorio MD     Date: 12/1/2021  Time: 15:14 EST      
Bleeding that does not stop/Persistent Nausea and Vomiting/Unable to Urinate/Pain not relieved by Medications/Fever greater than 101

## 2021-12-01 NOTE — ANESTHESIA PREPROCEDURE EVALUATION
" Anesthesia Evaluation     Patient summary reviewed and Nursing notes reviewed   NPO Solid Status: > 8 hours  NPO Liquid Status: > 2 hours           Airway   Mallampati: I  TM distance: >3 FB  Neck ROM: full  No difficulty expected  Dental      Pulmonary    (+) sleep apnea,   Cardiovascular     ECG reviewed    (-) hypertension, past MI, dysrhythmias, angina, cardiac stents    ROS comment: ECG NSR LAD    Neuro/Psych  (+) headaches, psychiatric history (xanax QHS -CV symtoms ),     GI/Hepatic/Renal/Endo    (+)  GERD,    (-) liver disease, no renal disease, diabetes, no thyroid disorder    Musculoskeletal     Abdominal    Substance History      OB/GYN          Other   arthritis,      ROS/Med Hx Other: No anticoagulants                  Anesthesia Plan    ASA 2     spinal and MAC   (Propofol infusion as part of an anti PONV technique  L flank marked with \"A\" )  intravenous induction     Anesthetic plan, all risks, benefits, and alternatives have been provided, discussed and informed consent has been obtained with: patient.    Plan discussed with CRNA.      "

## 2021-12-01 NOTE — ANESTHESIA POSTPROCEDURE EVALUATION
Patient: Adonay Eason    Procedure Summary     Date: 12/01/21 Room / Location:  [facility]    Anesthesia Start: 1310 Anesthesia Stop:     Procedure: TOTAL HIP ARTHROPLASTY LEFT (Left Hip) Diagnosis:       Primary osteoarthritis of left hip      (Primary osteoarthritis of left hip [M16.12])    Surgeons: Omi Osorio MD Provider: Tremaine Hurley MD    Anesthesia Type: spinal, MAC ASA Status: 2          Anesthesia Type: spinal, MAC    Vitals  Vitals Value Taken Time   /85 12/01/21 1532   Temp     Pulse 83 12/01/21 1535   Resp     SpO2 94 % 12/01/21 1535   Vitals shown include unvalidated device data.        Post Anesthesia Care and Evaluation    Patient location during evaluation: PACU  Patient participation: complete - patient participated  Level of consciousness: awake and alert  Pain management: adequate  Airway patency: patent  Anesthetic complications: No anesthetic complications  PONV Status: none  Cardiovascular status: acceptable  Respiratory status: acceptable  Hydration status: acceptable

## 2021-12-01 NOTE — OP NOTE
OPERATIVE REPORT     DATE OF PROCEDURE: 12/1/2021    SURGEON: Omi Osorio M.D.     ASSISTANT(S): Circulator: Feli Syed RN; Anibal Hester RN; Mireya Ellison, RN  Physician Assistant: Paloma Banerjee PA  Scrub Person: Lis Null  Nursing Assistant: Nava Franklin-PA was utilized during the case to facilitate positioning the patient, exposure, retraction, placement of final components and definitive closure.    PREOPERATIVE DIAGNOSIS: Advanced degenerative joint disease of the left hip secondary to osteoarthritis    POSTOPERATIVE DIAGNOSIS: same     PROCEDURE: Left total Hip Arthroplasty     SURGICAL DETAILS:     APPROACH: Posterior    ANESTHESIA: Spinal plus local periarticular block    PREOPERATIVE ANTIBIOTICS: Ancef 2 g IV    TRANEXAMIC ACID: IV    ESTIMATED BLOOD LOSS: 300 cc     SPECIMENS: None    IMPLANTS:   : Maryuri  Acetabular component: 58 mm Trident 2   Acetabular screws: 2  Acetabular liner: 0 degree X3   Femoral component: Accolade  degree size 6  Femoral head: 36+5 mm Biolox ceramic     DRAINS: None    LOCAL INJECTION: 1 cc Toradol 30mg/ml, 4 cc duramorph 2mg/ml, 20 cc 0.5% ropivicaine, 20 cc 0.5% lidocaine with 1:200,000 epinephrine, 15 cc preservative free normal saline     MODIFIER(S): None    COMPLICATIONS: None apparent    INDICATIONS FOR PROCEDURE: This patient has a history of progressive left hip pain and arthritis. The hip pain is severe with activity and has progressed significantly. Non-operative treatment has been attempted, but has not improved or controlled symptoms during normal daily activities. Motion has become limited and rotation severely restricted. X-rays reveal moderate-to-severe eburnation of articular cartilage on the superior weight bearing surface of the hip with circumferential acetabular and femoral neck osteophytes consistent with advanced hip osteoarthritis. A total hip arthroplasty was recommended at this time. The risks,  benefits, alternatives, and potential complications of the arthroplasty surgery were discussed with the patient in detail to include but not limited to infection, bleeding, anesthesia risks, sciatic nerve palsy, instability/dislocation, limb length discrepancy, aseptic loosening, osteolysis, blood clots, continued pain, iatrogenic fracture, myocardial infarction, stroke, and death. Specific details of the procedure, hospitalization, recovery, rehabilitation, and long-term precautions were also provided. Pre-operative teaching was provided. Implant/prosthesis selection was outlined, and the many options available were explained; the final choice will be made at the time of the procedure to match the anatomy and condition of the bone, ligaments, tendons, and muscles. Understanding of all topics was conveyed to me by the patient, and consent was given to proceed with a left total hip arthroplasty. The patient completed preoperative medical optimization and risk assessment, joint arthroplasty education, and MRSA decolonization using a universal decolonization protocol. Perioperative blood management and the potential for blood transfusion were discussed with risks and options clearly outlined.     INTRAOPERATIVE FINDINGS: End-stage osteoarthritis left hip    PROCEDURE: The patient was identified in the preoperative holding area. The operative site was confirmed and marked. A sequential compression device was placed on the nonoperative leg. The risks, benefits, and alternatives to surgery were again confirmed with the patient and the patient wished to proceed. The patient was brought to the operating room and placed on the operating room table in the supine position. A huddle was performed with the patient and all vital surgical team members to confirm the correct operative site, procedure, anesthesia type, and operative plan with the patient. After anesthesia was performed, the patient was positioned in the lateral  decubitus position on the pegboard and secured with the operative side up. An axillary roll was placed in the axilla and all bony prominences and pressure points were checked and padded. A relative leg length assessment was carried out and markers were placed for intraoperative assessment. Intravenous antibiotic prophylaxis was given and confirmed with the anesthesia team.     The operative leg was prepped and draped in the usual sterile fashion. A surgical time out was performed immediately preceding the incision with all personnel in the operating room to again confirm patient identity, the correct operative site and extremity, correct radiographic studies, availability of appropriate surgical equipment and agreement on the planned procedure. A posterolateral approach to the hip was performed through an incision centered over the greater trochanter. The incision was carried through the subcutaneous tissue to the underlying fascia josiane and gluteus nate fascia, which were incised and split posteriorly over the trochanter in the direction of the fibers. Hemostasis was obtained with electrocautery. The Charnley retractor was placed after carefully palpating the sciatic nerve which was protected throughout the case.     A standard posterior approach to the hip was performed by releasing the piriformis, short external rotators and posterior capsule and reflecting them posteriorly as a rectangular flap. The superior capsule was scarred down; it was released and excised. The labrum was split and the femoral head mobilized. The hip was then flexed, internally rotated and dislocated from the acetabulum without excessive force. Assessment of the femoral head revealed eburnation of the articular cartilage with complete loss of the weight bearing chondral surface. Osteophytes were present as well. Careful measurements were performed using the center of the femoral head and the lesser trochanter as markers and a femoral neck  cut was made according to the preoperative plan.     Attention was then turned to the acetabulum. Retractors were placed circumferentially for wide acetabular exposure. The labrum and osteophytes were debrided from the rim, and the medial wall was identified and the depth of the socket assessed by excising the pulvinar. Bleeders were controlled, especially the area of the obturator artery with the electrocautery. Acetabular reaming was then started with the hemispherical instrument matching the size of the excised femoral head. Sequential reaming of the acetabulum was then performed by increasing size in 2 mm increments.  Reaming was performed line to line. The reamers created an excellent hemispherical bed of bleeding cancellous bone. The cup was impacted into position, targeting 40-45 degrees of abduction and 20-25 degrees of anteversion, with an excellent press-fit. The press-fit was firm, stable, and apically seated.  2 screw(s) were used for additional support of the fixation. Further osteophyte debridement was done around the socket. All impinging soft tissue was removed from the edges of the socket. The polyethylene bearing/liner was then impacted into place and checked for stability.     Attention was then turned to the femur. The leg was positioned so access did not result in soft-tissue injury. The femoral preparation was started with a box osteotome. The medullary cavity of the femur was then entered and opened with hand reamers. Femoral stem broaches were then employed in an incremental fashion up to the final size, targeting 15-20 degrees of anteversion. The final broach was fully seated, had good rotational and axial stability, and was seated at the appropriate height in relation to the greater trochanter and the preoperative plan. Trial reduction was done. Excellent stability and range of motion was achieved without impingement at any position. The hip was stable in full extension and external  rotation as well as in flexion past 90 degrees, 20 degrees adduction and 60-70 degrees of internal rotation. Leg lengths were re-created within millimeters based on the markers and relative measurement. The hip was then dislocated and the trials removed. The wound was copiously irrigated, and the permanent femoral stem was then impacted down in approximately 15-20 degrees of anteversion. The press-fit was firm, and stable to axial and rotational force in all planes. The permanent femoral head was then impacted on the clean trunnion. The socket and wound were irrigated, suctioned, and inspected for debris. The final reduction was performed, and again leg length assessment and stability assessment of the hip were performed to confirm optimal component selection and stability in all planes without impingement when stressed to the extremes.     The wound was irrigated with dilute betadine solution as well as saline, and hemostasis obtained with electrocautery. A pain cocktail was injected into the pericapsular tissues. The posterior capsule, piriformis, and short external rotators were repaired utilizing #2 Ticron through drill holes in the greater trochanter. The sciatic nerve was palpated and found to be intact and the wound was irrigated. Instrument and sponge counts were completed and confirmed correct. The fascia josiane and gluteal fascia were closed with interrupted #1 Vicryl suture and oversewn with #2 Stratafix. The deep subcutaneous tissue was closed with running #1 Stratafix suture and the superficial subcutaneous tissue with interrupted 2-0 Vicryl suture. A 3-0 monocryl running stitch was used to close skin followed by skin glue adhesive to seal the wound. A silver impregnated dressing was then placed, followed by a sequential compression device to the operative limb, followed by an abduction pillow. The patient was then returned to a supine position on the operating room table. The patient was sufficiently  recovered from anesthesia, transferred to a hospital bed and taken to the PACU in stable condition.     One gram (1000 mg) of intravenous tranexamic acid was administered prior to incision. A second one gram (1000 mg) intravenous dose was given prior to wound closure.    No apparent complications occurred during the procedure Instrument, sponge and needle counts were correct x 2.     The patient underwent risk stratification preoperatively and aspirin was chosen for DVT prophylaxis. Delay in starting chemical prophylaxis for 23 hours from surgical incision was over concerns for hematoma formation and wound related issues.     POST OPERATIVE PLAN:   Protected weight bearing as tolerated   Posterior hip precautions x 6 weeks   PT/OT for mobilization and medical equipment needs   23 hours perioperative antibiotic prophylaxis   Pain control with PO/IV meds   Keep silver dressing in place for 7 days post op. Change dressing only if saturated.   SCD's to bilateral lower extremities   Social work for discharge planning needs   Follow up in 3 weeks for post operative wound check with XR AP pelvis.

## 2021-12-02 NOTE — CASE MANAGEMENT/SOCIAL WORK
Continued Stay Note  [facility]     Patient Name: Adonay Eason  MRN: [id number]  Today's Date: 12/2/2021    Admit Date: 12/1/2021     Discharge Plan     Row Name 12/02/21 0952       Plan    Plan Comments Case mgt f/u. I spoke with wife this am,she confirmed that Mr Eason would still like to use [facility] outpatient PT. I spoke with Epifanio at [facility] who accepted referral. their PT will make home visits until ready to transition to outpatient visits . They will plan to see him at home today or tomorrow 12/3    Final Discharge Disposition Code 01 - home or self-care               Discharge Codes    No documentation.               Expected Discharge Date and Time     Expected Discharge Date Expected Discharge Time    Dec 1, 2021             Sonja C Kellerman, RN

## 2021-12-10 ENCOUNTER — APPOINTMENT (OUTPATIENT)
Dept: CARDIOLOGY | Facility: HOSPITAL | Age: 71
End: 2021-12-10

## 2021-12-10 ENCOUNTER — TELEPHONE (OUTPATIENT)
Dept: ORTHOPEDIC SURGERY | Facility: CLINIC | Age: 71
End: 2021-12-10

## 2021-12-10 ENCOUNTER — HOSPITAL ENCOUNTER (EMERGENCY)
Facility: HOSPITAL | Age: 71
Discharge: HOME | End: 2021-12-10
Attending: EMERGENCY MEDICINE | Admitting: EMERGENCY MEDICINE

## 2021-12-10 VITALS
DIASTOLIC BLOOD PRESSURE: 98 MMHG | HEIGHT: 71 IN | HEART RATE: 73 BPM | WEIGHT: 205 LBS | OXYGEN SATURATION: 94 % | SYSTOLIC BLOOD PRESSURE: 153 MMHG | TEMPERATURE: 98.4 F | RESPIRATION RATE: 18 BRPM | BODY MASS INDEX: 28.7 KG/M2

## 2021-12-10 DIAGNOSIS — R60.0 LEG EDEMA, LEFT: Primary | ICD-10-CM

## 2021-12-10 DIAGNOSIS — M79.605 PAIN OF LEFT LOWER EXTREMITY: ICD-10-CM

## 2021-12-10 LAB
BH CV ECHO MEAS - BSA(HAYCOCK): 2.2 M^2
BH CV ECHO MEAS - BSA: 2.2 M^2
BH CV ECHO MEAS - BZI_BMI: 27.8 KILOGRAMS/M^2
BH CV ECHO MEAS - BZI_METRIC_HEIGHT: 182.9 CM
BH CV ECHO MEAS - BZI_METRIC_WEIGHT: 93 KG
BH CV LOWER VASCULAR LEFT COMMON FEMORAL AUGMENT: NORMAL
BH CV LOWER VASCULAR LEFT COMMON FEMORAL COMPRESS: NORMAL
BH CV LOWER VASCULAR LEFT COMMON FEMORAL PHASIC: NORMAL
BH CV LOWER VASCULAR LEFT COMMON FEMORAL SPONT: NORMAL
BH CV LOWER VASCULAR LEFT DISTAL FEMORAL AUGMENT: NORMAL
BH CV LOWER VASCULAR LEFT DISTAL FEMORAL COMPRESS: NORMAL
BH CV LOWER VASCULAR LEFT DISTAL FEMORAL PHASIC: NORMAL
BH CV LOWER VASCULAR LEFT DISTAL FEMORAL SPONT: NORMAL
BH CV LOWER VASCULAR LEFT GASTRONEMIUS COMPRESS: NORMAL
BH CV LOWER VASCULAR LEFT GREATER SAPH AK COMPRESS: NORMAL
BH CV LOWER VASCULAR LEFT GREATER SAPH BK COMPRESS: NORMAL
BH CV LOWER VASCULAR LEFT LESSER SAPH COMPRESS: NORMAL
BH CV LOWER VASCULAR LEFT MID FEMORAL AUGMENT: NORMAL
BH CV LOWER VASCULAR LEFT MID FEMORAL COMPRESS: NORMAL
BH CV LOWER VASCULAR LEFT MID FEMORAL PHASIC: NORMAL
BH CV LOWER VASCULAR LEFT MID FEMORAL SPONT: NORMAL
BH CV LOWER VASCULAR LEFT PERONEAL COMPRESS: NORMAL
BH CV LOWER VASCULAR LEFT POPLITEAL AUGMENT: NORMAL
BH CV LOWER VASCULAR LEFT POPLITEAL COMPRESS: NORMAL
BH CV LOWER VASCULAR LEFT POPLITEAL PHASIC: NORMAL
BH CV LOWER VASCULAR LEFT POPLITEAL SPONT: NORMAL
BH CV LOWER VASCULAR LEFT POSTERIOR TIBIAL COMPRESS: NORMAL
BH CV LOWER VASCULAR LEFT PROFUNDA FEMORAL AUGMENT: NORMAL
BH CV LOWER VASCULAR LEFT PROFUNDA FEMORAL COMPRESS: NORMAL
BH CV LOWER VASCULAR LEFT PROFUNDA FEMORAL PHASIC: NORMAL
BH CV LOWER VASCULAR LEFT PROFUNDA FEMORAL SPONT: NORMAL
BH CV LOWER VASCULAR LEFT PROXIMAL FEMORAL AUGMENT: NORMAL
BH CV LOWER VASCULAR LEFT PROXIMAL FEMORAL COMPRESS: NORMAL
BH CV LOWER VASCULAR LEFT PROXIMAL FEMORAL PHASIC: NORMAL
BH CV LOWER VASCULAR LEFT PROXIMAL FEMORAL SPONT: NORMAL
BH CV LOWER VASCULAR LEFT SAPHENOFEMORAL JUNCTION AUGMENT: NORMAL
BH CV LOWER VASCULAR LEFT SAPHENOFEMORAL JUNCTION COMPRESS: NORMAL
BH CV LOWER VASCULAR LEFT SAPHENOFEMORAL JUNCTION PHASIC: NORMAL
BH CV LOWER VASCULAR LEFT SAPHENOFEMORAL JUNCTION SPONT: NORMAL
BH CV LOWER VASCULAR LEFT SOLEAL COMPRESS: NORMAL
BH CV LOWER VASCULAR RIGHT COMMON FEMORAL AUGMENT: NORMAL
BH CV LOWER VASCULAR RIGHT COMMON FEMORAL COMPRESS: NORMAL
BH CV LOWER VASCULAR RIGHT COMMON FEMORAL PHASIC: NORMAL
BH CV LOWER VASCULAR RIGHT COMMON FEMORAL SPONT: NORMAL
MAXIMAL PREDICTED HEART RATE: 151 BPM
STRESS TARGET HR: 128 BPM

## 2021-12-10 PROCEDURE — 93971 EXTREMITY STUDY: CPT

## 2021-12-10 PROCEDURE — 99282 EMERGENCY DEPT VISIT SF MDM: CPT

## 2021-12-10 NOTE — TELEPHONE ENCOUNTER
Patient's wife Yoon called stating below the knee, he is having swelling in the ankle, thigh and down to the foot. Around the toes, they had turned blue when he stood up to take a sponge bath. Patient has no temp and the bandage was taken off the area when PT came over the last visit. Patient uses [facility] pharmacy in [place]. Mrs Nettles ask if you can LVM and name if she does not answer to call back at [phone]

## 2021-12-10 NOTE — TELEPHONE ENCOUNTER
I called patient's wife and explained that they need to go to the ED to be evaluated for a blood clot. She understood and they will come to [facility] for ED for that.  Emeli

## 2021-12-10 NOTE — DISCHARGE INSTRUCTIONS
Your ultrasound was negative for DVT at today's visit.  I suggest following up with primary care physician.  Follow-up with Dr. Osorio.

## 2021-12-10 NOTE — ED PROVIDER NOTES
Subjective   Adonay Eason is a 70 yr old male that presents emergency department complaints of left leg pain, swelling.  Patient reports that he noticed the swelling in his left lower extremity this morning.  Patient had his hip replaced on December 1.  Patient's orthopedic physician is Dr. Osorio.  Patient denies any history of DVT, PE.  He denies any chest pain or shortness of breath.  Patient is concerned that he could have a blood clot in his left lower extremity.  Patient is able to bear weight.      History provided by:  Patient   used: No    Leg Swelling  Location:  Lt lower leg  Severity:  Moderate  Duration:  1 day  Timing:  Constant  Progression:  Worsening  Associated symptoms: no chest pain, no fever, no nausea, no shortness of breath and no vomiting        Review of Systems   Constitutional: Negative for chills and fever.   Respiratory: Negative for shortness of breath.    Cardiovascular: Negative for chest pain.   Gastrointestinal: Negative for nausea and vomiting.   Musculoskeletal:        Lt leg pain, Lt leg swelling   Neurological: Negative for weakness and numbness.   All other systems reviewed and are negative.      Past Medical History:   Diagnosis Date   • Arthritis    • Chronic pain in right shoulder    • TENA (generalized anxiety disorder)    • GERD (gastroesophageal reflux disease)    • Insomnia    • Migraines    • Panic attacks    • RLS (restless legs syndrome)    • Seasonal allergies    • Wears glasses     reading glasses       No Known Allergies    Past Surgical History:   Procedure Laterality Date   • ANAL FISTULA REPAIR  2009   • CERVICAL DISCECTOMY ANTERIOR  2000   • COLONOSCOPY  2009   • KNEE CARTILAGE SURGERY Right 1977   • KNEE SURGERY Left 2000   • LUMBAR DISCECTOMY  2011   • SHOULDER ROTATOR CUFF REPAIR Right 2019   • TOTAL HIP ARTHROPLASTY Left 12/1/2021    Procedure: TOTAL HIP ARTHROPLASTY LEFT;  Surgeon: Omi Osorio MD;  Location: [facility];   Service: Orthopedics;  Laterality: Left;       Family History   Problem Relation Age of Onset   • Dementia Mother    • Cholecystitis Father        Social History     Socioeconomic History   • Marital status:      Spouse name: Yoon   • Number of children: 0   • Years of education: H.S.   • Highest education level: High school graduate   Tobacco Use   • Smoking status: Former Smoker     Packs/day: 1.00     Years: 10.00     Pack years: 10.00     Types: Cigarettes     Quit date:      Years since quittin.9   • Smokeless tobacco: Never Used   Vaping Use   • Vaping Use: Never used   Substance and Sexual Activity   • Alcohol use: Not Currently     Comment: QUIT 2+ YRS AGO   • Drug use: No   • Sexual activity: Yes     Partners: Female           Objective   Physical Exam  Vitals and nursing note reviewed.   Constitutional:       Appearance: Normal appearance. He is well-developed. He is not ill-appearing or toxic-appearing.   HENT:      Head: Normocephalic and atraumatic.      Nose: Nose normal.      Mouth/Throat:      Mouth: Mucous membranes are moist.   Eyes:      General: Lids are normal.      Conjunctiva/sclera: Conjunctivae normal.   Neck:      Trachea: Trachea normal.   Cardiovascular:      Rate and Rhythm: Regular rhythm.      Pulses: Normal pulses.      Heart sounds: Normal heart sounds.   Pulmonary:      Effort: Pulmonary effort is normal. No respiratory distress.      Breath sounds: Normal breath sounds. No decreased breath sounds, wheezing, rhonchi or rales.   Abdominal:      General: Bowel sounds are normal.      Palpations: Abdomen is soft.      Tenderness: There is no abdominal tenderness.   Musculoskeletal:         General: Swelling (Lt leg pain/ swelling) present.      Cervical back: Full passive range of motion without pain and normal range of motion.      Left lower leg: Edema present.   Skin:     General: Skin is warm and dry.      Findings: No rash.   Neurological:      Mental Status: He is  alert and oriented to person, place, and time.      Cranial Nerves: No cranial nerve deficit.   Psychiatric:         Speech: Speech normal.         Behavior: Behavior normal. Behavior is cooperative.         Procedures           ED Course  ED Course as of 12/10/21 1432   Fri Dec 10, 2021   1421 Negative for DVT/SVT. [KG]   1421 Results were discussed with patient at this time.  Patient will be discharged home.  Patient to follow-up with primary care physician.  Patient to return the ER as needed.  Patient agrees with treatment plan and verbalized understanding. [KG]      ED Course User Index  [KG] Seble Galvan, SABI           Recent Results (from the past 24 hour(s))   Duplex Venous Lower Extremity - Left CAR    Collection Time: 12/10/21  2:00 PM   Result Value Ref Range    BSA 2.2 m^2     CV ECHO FRANKLIN - BZI_BMI 27.8 kilograms/m^2     CV ECHO [place]LIN - BSA([place]) 2.2 m^2     CV ECHO [place]LIN - BZI_METRIC_WEIGHT 93.0 kg     CV ECHO [place]ANKLIN - BZI_METRIC_HEIGHT 182.9 cm    Target HR (85%) 128 bpm    Max. Pred. HR (100%) 151 bpm    Right Common Femoral Spont Y     Right Common Femoral Phasic Y     Right Common Femoral Augment Y     Right Common Femoral Compress C     Left Common Femoral Spont Y     Left Common Femoral Phasic Y     Left Common Femoral Augment Y     Left Common Femoral Compress C     Left Saphenofemoral Junction Spont Y     Left Saphenofemoral Junction Phasic Y     Left Saphenofemoral Junction Augment Y     Left Saphenofemoral Junction Compress C     Left Profunda Femoral Spont Y     Left Profunda Femoral Phasic Y     Left Profunda Femoral Augment Y     Left Profunda Femoral Compress C     Left Proximal Femoral Spont Y     Left Proximal Femoral Augment Y     Left Proximal Femoral Compress C     Left Mid Femoral Spont Y     Left Mid Femoral Phasic Y     Left Mid Femoral Augment Y     Left Mid Femoral Compress C     Left Distal Femoral Spont Y     Left Distal Femoral Phasic Y     Left Distal Femoral  "Augment Y     Left Distal Femoral Compress C     Left Popliteal Spont Y     Left Popliteal Phasic Y     Left Popliteal Augment Y     Left Popliteal Compress C     Left Posterior Tibial Compress C     Left Peroneal Compress C     Left Gastronemius Compress C     Left Greater Saph AK Compress C     Left Greater Saph BK Compress C     Left Lesser Saph Compress C     Lt soleal compress C     Left Proximal Femoral Phasic Y      Note: In addition to lab results from this visit, the labs listed above may include labs taken at another facility or during a different encounter within the last 24 hours. Please correlate lab times with ED admission and discharge times for further clarification of the services performed during this visit.    No orders to display     Vitals:    12/10/21 1157 12/10/21 1400   BP: 153/98    BP Location: Left arm    Patient Position: Sitting    Pulse: 73    Resp: 18    Temp: 98.4 °F (36.9 °C)    TempSrc: Temporal    SpO2: 94%    Weight: 93 kg (205 lb) 93 kg (205 lb)   Height: 182.9 cm (72\") 180 cm (70.87\")     Medications - No data to display  ECG/EMG Results (last 24 hours)     ** No results found for the last 24 hours. **        No orders to display                                             MDM    Final diagnoses:   Leg edema, left   Pain of left lower extremity       ED Disposition  ED Disposition     ED Disposition Condition Comment    Discharge Stable           Jeffery Hester MD  [address] [address] [phone]               Medication List      No changes were made to your prescriptions during this visit.          Seble Galvan, APRN  12/10/21 1432    "

## 2021-12-14 ENCOUNTER — TELEPHONE (OUTPATIENT)
Dept: ORTHOPEDIC SURGERY | Facility: CLINIC | Age: 71
End: 2021-12-14

## 2021-12-14 NOTE — TELEPHONE ENCOUNTER
Provider: WILFREDO CRAMER    Caller: WIFE     Relationship to Patient: WIFE CORDELL     Phone Number:  [phone]    Reason for Call:  PT;S WIFE CALLING. PT. HAD LEFT HIP SURGERY WITH DR. VILLALOBOS ON 12/01/21. HE HAS BEEN HAVING SWELLING IN HIS LEG AREA- DOWN FROM HIS HIP.   THEY WENT TO ER PER DR. VILLALOBOS'S INSTRUCTIONS- WENT ON Friday, 12/10/21.   THEY DID A DOPPLER AND SAID HE DOES NOT HAVE A BLOOD CLOT, AND HE WAS SENT HOME.   WIFE IS VERY CONCERNED SINCE PT. IS STILL HAVING BLUENESS AND SWELLING IN HIS FOOT.   ASKING IF DR. VILLALOBOS OR ASSISTANT WILL CALL PT. ASAP TO ADVISE.

## 2021-12-14 NOTE — TELEPHONE ENCOUNTER
I spoke with the patient and his wife.  I explained that he must elevate higher than his heart to get the swelling to decrease- one pillow under his head and 4 under the leg.  This will also help with the blueness in his foot.  He asked about icing and I told him :20 on and :40 off repeat as needed.     He is going to elevate and call me tomorrow at 2:00 if no improvement and we will bring him in Thursday to see Dr. Osorio.    Vikki Cantu

## 2021-12-14 NOTE — TELEPHONE ENCOUNTER
----- Message from Adonay Poon sent at 12/14/2021  2:43 PM EST -----  Regarding: swell of foot, calf and left leg which received a hip replacement.  My foot is continuing to swell and show bruises plus my calf  up to knee is also swollen.  I visited ER on Friday concerning a blood clot, but the reading were negative.     I'm including (hopefully) a couple of pics that my wife took of the side of my foot.    thank you very much for your attention.  Adonay poon    hopefully I reduced pic size to 4MB

## 2021-12-15 NOTE — TELEPHONE ENCOUNTER
I spoke with the patient again today.  He is doing much better. He has been elevating higher than his heart and already notices a little difference in the swelling.  The color looks good, even though he has bruising down by the ankle.  I did explain that is normal.  He will continue to elevate and call me back if he has any questions.    Vikki Cantu

## 2021-12-21 ENCOUNTER — OFFICE VISIT (OUTPATIENT)
Dept: ORTHOPEDIC SURGERY | Facility: CLINIC | Age: 71
End: 2021-12-21

## 2021-12-21 VITALS — TEMPERATURE: 97.1 F

## 2021-12-21 DIAGNOSIS — Z96.642 STATUS POST LEFT HIP REPLACEMENT: Primary | ICD-10-CM

## 2021-12-21 PROCEDURE — 99024 POSTOP FOLLOW-UP VISIT: CPT | Performed by: PHYSICIAN ASSISTANT

## 2021-12-21 NOTE — PROGRESS NOTES
[facility]      Subjective     CC: Post-op (3 weeks status post Total Hip Arthroplasty Left 12-1-21)      HPI    Adonay Eason is a 70 y.o. male.  Patient presents today 3 weeks status post left total hip arthroplasty.  He reports minimal pain.  The right side is bothering more than the left.  He is doing outpatient PT.  He is using a cane for ambulation.  Taking occasional oxycodone as well as Tylenol for pain    Overall, patient's symptoms are improved    ROS:    Constiutional:Pt denies fever, chills, nausea, or vomiting.  MSK:as above        Objective      Past Medical History  Past Medical History:   Diagnosis Date   • Arthritis    • Chronic pain in right shoulder    • TENA (generalized anxiety disorder)    • GERD (gastroesophageal reflux disease)    • Insomnia    • Migraines    • Panic attacks    • RLS (restless legs syndrome)    • Seasonal allergies    • Wears glasses     reading glasses         Physical Exam  Temp 97.1 °F (36.2 °C)     There is no height or weight on file to calculate BMI.    Patient is well nourished and well developed.        Ortho Exam  Left hip exam: Posterior hip incision is healing well.  No pain with hip motion neurovascular intact distally ambulating with a cane    Imaging/Labs/EMG Reviewed:  Imaging Results (Last 24 Hours)     Procedure Component Value Units Date/Time    XR Pelvis 1 or 2 View [[id number]] Resulted: 12/21/21 1300     Updated: 12/21/21 1301    Narrative:      Left Hip X-Ray    Indication: s/p Total Hip Arthroplasty    Views:  AP pelvis     Comparison: AP pelvis 12/1/2021    Findings:  No acute fractures are visualized  No signs of loosening are present in the femoral or acetabular components  No bony lesions are appreciated  Normal soft tissue appearance    Impression:  Status post JEAN PAUL with no evidence of loosening             Assessment    Assessment:  1. Status post left hip replacement        Plan:  Recommend over the counter  anti-inflammatories for pain and/or swelling  Doing well status post left total hip arthroplasty on 12/1/2021.  Patient will continue outpatient PT.  He will return to see Dr. Osorio in 3 weeks with repeat AP pelvis or sooner if needed.      Roopa Cantu PA-C  12/21/21  13:18 PEDRO PABLO La disclaimer:  Much of this encounter note is an electronic transcription/translation of spoken language to printed text. The electronic translation of spoken language may permit erroneous, or at times, nonsensical words or phrases to be inadvertently transcribed; Although I have reviewed the note for such errors, some may still exist.

## 2022-01-11 ENCOUNTER — OFFICE VISIT (OUTPATIENT)
Dept: ORTHOPEDIC SURGERY | Facility: CLINIC | Age: 72
End: 2022-01-11

## 2022-01-11 VITALS
DIASTOLIC BLOOD PRESSURE: 100 MMHG | SYSTOLIC BLOOD PRESSURE: 140 MMHG | BODY MASS INDEX: 31.64 KG/M2 | HEIGHT: 71 IN | WEIGHT: 226 LBS

## 2022-01-11 DIAGNOSIS — M16.11 PRIMARY OSTEOARTHRITIS OF RIGHT HIP: Primary | ICD-10-CM

## 2022-01-11 DIAGNOSIS — Z96.642 STATUS POST LEFT HIP REPLACEMENT: ICD-10-CM

## 2022-01-11 PROCEDURE — 99214 OFFICE O/P EST MOD 30 MIN: CPT | Performed by: ORTHOPAEDIC SURGERY

## 2022-01-11 RX ORDER — MELOXICAM 7.5 MG/1
15 TABLET ORAL ONCE
Status: CANCELLED | OUTPATIENT
Start: 2022-01-11 | End: 2022-01-11

## 2022-01-11 RX ORDER — PREGABALIN 75 MG/1
75 CAPSULE ORAL ONCE
Status: CANCELLED | OUTPATIENT
Start: 2022-01-11 | End: 2022-01-11

## 2022-01-11 RX ORDER — ACETAMINOPHEN 325 MG/1
1000 TABLET ORAL ONCE
Status: CANCELLED | OUTPATIENT
Start: 2022-01-11 | End: 2022-01-11

## 2022-01-11 NOTE — PROGRESS NOTES
Orthopaedic Clinic Note:  Hip Post Op    Chief Complaint   Patient presents with   • Post-op     3 week follow up; 6 weeks status post Total Hip Arthroplasty Left 12-1-21   Follow-up right hip pain    HPI    Mr. Eason returns to clinic today for follow-up of right hip osteoarthritis.  He is 6  week(s) s/p left total hip arthroplasty.  Rates his pain a 2/10 on the pain scale.  In regards to the left hip.  His right hip is continuing to cause significant limitations in pain that he rates a 6/10 on the pain scale.  He is ambulating with assistance of a cane.  Doing outpatient physical therapy at [facility].  He feels that he is doing well but he is limited by his right hip in terms of rehab.  He is wishing to pursue surgical intervention on the right side.    Past Medical History:   Diagnosis Date   • Arthritis    • Chronic pain in right shoulder    • TENA (generalized anxiety disorder)    • GERD (gastroesophageal reflux disease)    • Insomnia    • Migraines    • Panic attacks    • RLS (restless legs syndrome)    • Seasonal allergies    • Wears glasses     reading glasses      Past Surgical History:   Procedure Laterality Date   • ANAL FISTULA REPAIR  2009   • CERVICAL DISCECTOMY ANTERIOR  2000   • COLONOSCOPY  2009   • KNEE CARTILAGE SURGERY Right 1977   • KNEE SURGERY Left 2000   • LUMBAR DISCECTOMY  2011   • SHOULDER ROTATOR CUFF REPAIR Right 2019   • TOTAL HIP ARTHROPLASTY Left 12/1/2021    Procedure: TOTAL HIP ARTHROPLASTY LEFT;  Surgeon: Omi Osorio MD;  [facility];  Service: Orthopedics;  Laterality: Left;      Family History   Problem Relation Age of Onset   • Dementia Mother    • Cholecystitis Father      Social History     Socioeconomic History   • Marital status:      Spouse name: Yoon   • Number of children: 0   • Years of education: H.S.   • Highest education level: High school graduate   Tobacco Use   • Smoking status: Former Smoker     Packs/day: 1.00     Years: 10.00     Pack years:  "10.     Types: Cigarettes     Quit date:      Years since quittin.0   • Smokeless tobacco: Never Used   Vaping Use   • Vaping Use: Never used   Substance and Sexual Activity   • Alcohol use: Not Currently     Comment: QUIT 2+ YRS AGO   • Drug use: No   • Sexual activity: Yes     Partners: Female      Current Outpatient Medications on File Prior to Visit   Medication Sig Dispense Refill   • acetaminophen (TYLENOL) 500 MG tablet Take 2 tablets by mouth Every 8 (Eight) Hours. For 1 week, then as needed 42 tablet 0   • ALPRAZolam (XANAX) 1 MG tablet Take 1-2 mg by mouth At Night As Needed for Anxiety or Sleep.     • diphenhydrAMINE HCl (ZzzQuil) 50 MG/30ML liquid Take  by mouth At Night As Needed (SLEEP).     • esomeprazole (nexIUM) 40 MG capsule Take 40 mg by mouth Every Morning Before Breakfast.     • meloxicam (Mobic) 15 MG tablet Take 1 tablet by mouth Daily. 10 tablet 0   • oxyCODONE (ROXICODONE) 5 MG immediate release tablet Take 1 tablet by mouth Every 4 (Four) Hours As Needed for Moderate Pain . 40 tablet 0   • pramipexole (MIRAPEX) 1.5 MG tablet Take 1.5 mg by mouth 3 (Three) Times a Day.     • docusate sodium (Colace) 100 MG capsule Take 1 capsule by mouth 2 (Two) Times a Day. 60 capsule 0     No current facility-administered medications on file prior to visit.      No Known Allergies     Review of Systems   Constitutional: Negative.    HENT: Negative.    Eyes: Negative.    Respiratory: Negative.    Cardiovascular: Negative.    Gastrointestinal: Negative.    Endocrine: Negative.    Genitourinary: Negative.    Musculoskeletal: Positive for arthralgias.   Skin: Negative.    Allergic/Immunologic: Negative.    Neurological: Negative.    Hematological: Negative.    Psychiatric/Behavioral: Negative.         Physical Exam  Blood pressure 140/100, height 180 cm (70.87\"), weight 103 kg (226 lb).    Body mass index is 31.64 kg/m².    GENERAL APPEARANCE: awake, alert, oriented, in no acute distress and well " developed, well nourished  LUNGS:  breathing nonlabored  EXTREMITIES: no clubbing, cyanosis  PERIPHERAL PULSES: palpable dorsalis pedis and posterior tibial pulses bilaterally.     GAIT:  Antalgic           Hip Exam:   Right     RANGE OF MOTION:  EXTENSION/FLEXION:  normal (0-110 degrees)  IR (at 90 degrees of flexion):  neutral  ER (at 90 degrees of flexion):  25   PAIN WITH HIP MOTION:  yes, Localized to groin and lateral trochanter  PAIN WITH LOGROLL:  no      STINCHFIELD TEST: positive     STRENGTH:  ABDUCTOR:    5/5  ADDUCTOR:  5/5  HIP FLEXION:  5/5     GREATER TROCHANTER BURSAL PAIN:  yes    SENSATION TO LIGHT TOUCH:  DEEP PERONEAL/SUPERFICIAL PERONEAL/SURAL/SAPHENOUS/TIBIAL:   intact    EDEMA:  no  ERYTHEMA:  no  WOUNDS/INCISIONS:   no    Hip Exam:  Left    RANGE OF MOTION:  EXTENSION/FLEXION:  normal (0-110 degrees)  IR:  20  ER:  40  PAIN WITH HIP MOTION:  no  PAIN WITH LOGROLL:  no     STRENGTH:  ABDUCTOR:  5/5  ADDUCTOR:  5/5  HIP FLEXION:  5/5    GREATER TROCHANTER BURSAL PAIN:  yes    SENSATION TO LIGHT TOUCH:  DEEP PERONEAL/SUPERFICIAL PERONEAL/SURAL/SAPHENOUS/TIBIAL:   intact    EDEMA:  no  ERYTHEMA:  no  WOUNDS/INCISIONS:   yes, well healed surgical incision without evidence of erythema or drainage  _______________________________________________________________  _______________________________________________________________    RADIOGRAPHIC FINDINGS:   Indication: Right hip pain, status post left total hip arthroplasty    Comparison: Todays xrays were compared to previous xrays from 12/21/2021    AP pelvis: Right: advanced, end-stage osteoarthritis with bone on bone articulation, subchondral sclerosis, and subchondral cysts, there are marginal osteophytes visualized at the femoral head-neck junction and acetabular margins and No significant changes compared to prior radiographs.;Left: Demonstrate a well positioned total hip without evidence of wear, loosening, fracture or osteolysis, femoral head  is concentrically reduced within the acetabulum and No significant changes compared to prior radiographs.        Assessment/Plan:   Diagnosis Plan   1. Primary osteoarthritis of right hip  Case Request    COVID PRE-OP / PRE-PROCEDURE SCREENING ORDER (NO ISOLATION) - Swab, Nasopharynx    CBC and Differential    Basic metabolic panel    Protime-INR    APTT    Hemoglobin A1c    ECG 12 Lead    Nicotine & Metabolite, Quant    Case Request   2. Status post left hip replacement  XR Pelvis 1 or 2 View     Patient is doing well 6-week status post left total hip arthroplasty.  This time we will proceed to scheduling right hip replacement as this hip is limiting his mobility.    The patient has clinical and radiographic evidence of end-stage right hip joint degeneration. Conservative measures have been tried for 3 months or longer, but have failed to adequately treat or improve the patient's symptoms. Pain is restricting the patient's daily activities as well as quality of life. The recommendation at this time is to proceed with a right total hip arthroplasty with the goal to improve patient function and pain. The risks, benefits, potential complications, and alternatives were discussed with the patient in detail. Risks included but were not limited to bleeding, infection, anesthesia risks, damage to neurovascular structures, osteolysis, aseptic loosening, instability, dislocation, pain, continued pain, iatrogenic fracture, leg length discrepancy, possible need for future surgery including the potential for amputation, blood clots, myocardial infarction, stroke, and death. Paty-operative blood management and the potential for blood transfusion were discussed with risks and options clearly outlined. Specific details of the surgical procedure, hospitalization, recovery, rehabilitation, and long-term precautions were also presented. Pre-operative teaching was provided. Implant/prosthesis selection was outlined, and the many  options available were explained; the final choice will be made at the time of the procedure to match the anatomy and condition of the bone, ligaments, tendons, and muscles. Given this instruction, the patient elected to proceed with the right total hip arthroplasty. The patient will be seen by pre-admission testing for pre-operative optimization and risk assessment and will be scheduled for surgery once this is completed.    The patient is considered standard risk for DVT based on patient risk factors and will be placed on aspirin postoperatively for DVT prophylaxis.      Omi Osorio MD  01/11/22  14:08 EST

## 2022-02-17 ENCOUNTER — PRE-ADMISSION TESTING (OUTPATIENT)
Dept: PREADMISSION TESTING | Facility: HOSPITAL | Age: 72
End: 2022-02-17

## 2022-02-17 VITALS — BODY MASS INDEX: 30.98 KG/M2 | WEIGHT: 228.73 LBS | HEIGHT: 72 IN

## 2022-02-17 DIAGNOSIS — M16.11 PRIMARY OSTEOARTHRITIS OF RIGHT HIP: ICD-10-CM

## 2022-02-17 LAB
ANION GAP SERPL CALCULATED.3IONS-SCNC: 8 MMOL/L (ref 5–15)
APTT PPP: 32.9 SECONDS (ref 22–39)
BASOPHILS # BLD AUTO: 0.03 10*3/MM3 (ref 0–0.2)
BASOPHILS NFR BLD AUTO: 0.5 % (ref 0–1.5)
BUN SERPL-MCNC: 18 MG/DL (ref 8–23)
BUN/CREAT SERPL: 14.1 (ref 7–25)
CALCIUM SPEC-SCNC: 9.4 MG/DL (ref 8.6–10.5)
CHLORIDE SERPL-SCNC: 106 MMOL/L (ref 98–107)
CO2 SERPL-SCNC: 27 MMOL/L (ref 22–29)
CREAT SERPL-MCNC: 1.28 MG/DL (ref 0.76–1.27)
DEPRECATED RDW RBC AUTO: 40.2 FL (ref 37–54)
EOSINOPHIL # BLD AUTO: 0.14 10*3/MM3 (ref 0–0.4)
EOSINOPHIL NFR BLD AUTO: 2.3 % (ref 0.3–6.2)
ERYTHROCYTE [DISTWIDTH] IN BLOOD BY AUTOMATED COUNT: 13 % (ref 12.3–15.4)
GFR SERPL CREATININE-BSD FRML MDRD: 56 ML/MIN/1.73
GLUCOSE SERPL-MCNC: 95 MG/DL (ref 65–99)
HBA1C MFR BLD: 5.5 % (ref 4.8–5.6)
HCT VFR BLD AUTO: 41.4 % (ref 37.5–51)
HGB BLD-MCNC: 14.3 G/DL (ref 13–17.7)
IMM GRANULOCYTES # BLD AUTO: 0.02 10*3/MM3 (ref 0–0.05)
IMM GRANULOCYTES NFR BLD AUTO: 0.3 % (ref 0–0.5)
INR PPP: 1.11 (ref 0.85–1.16)
LYMPHOCYTES # BLD AUTO: 2.66 10*3/MM3 (ref 0.7–3.1)
LYMPHOCYTES NFR BLD AUTO: 43.3 % (ref 19.6–45.3)
MCH RBC QN AUTO: 29.4 PG (ref 26.6–33)
MCHC RBC AUTO-ENTMCNC: 34.5 G/DL (ref 31.5–35.7)
MCV RBC AUTO: 85 FL (ref 79–97)
MONOCYTES # BLD AUTO: 0.55 10*3/MM3 (ref 0.1–0.9)
MONOCYTES NFR BLD AUTO: 9 % (ref 5–12)
NEUTROPHILS NFR BLD AUTO: 2.74 10*3/MM3 (ref 1.7–7)
NEUTROPHILS NFR BLD AUTO: 44.6 % (ref 42.7–76)
NRBC BLD AUTO-RTO: 0 /100 WBC (ref 0–0.2)
PLATELET # BLD AUTO: 209 10*3/MM3 (ref 140–450)
PMV BLD AUTO: 10.2 FL (ref 6–12)
POTASSIUM SERPL-SCNC: 4.3 MMOL/L (ref 3.5–5.2)
PROTHROMBIN TIME: 14 SECONDS (ref 11.4–14.4)
RBC # BLD AUTO: 4.87 10*6/MM3 (ref 4.14–5.8)
SODIUM SERPL-SCNC: 141 MMOL/L (ref 136–145)
WBC NRBC COR # BLD: 6.14 10*3/MM3 (ref 3.4–10.8)

## 2022-02-17 PROCEDURE — 36415 COLL VENOUS BLD VENIPUNCTURE: CPT

## 2022-02-17 PROCEDURE — 83036 HEMOGLOBIN GLYCOSYLATED A1C: CPT

## 2022-02-17 PROCEDURE — 85025 COMPLETE CBC W/AUTO DIFF WBC: CPT

## 2022-02-17 PROCEDURE — G0480 DRUG TEST DEF 1-7 CLASSES: HCPCS

## 2022-02-17 PROCEDURE — 80048 BASIC METABOLIC PNL TOTAL CA: CPT

## 2022-02-17 PROCEDURE — 85610 PROTHROMBIN TIME: CPT

## 2022-02-17 PROCEDURE — 85730 THROMBOPLASTIN TIME PARTIAL: CPT

## 2022-02-17 RX ORDER — IBUPROFEN 800 MG/1
800 TABLET ORAL AS NEEDED
COMMUNITY
Start: 2022-02-02

## 2022-02-17 ASSESSMENT — HOOS JR
HOOS JR SCORE: 16
HOOS JR SCORE: 39.902

## 2022-02-17 NOTE — PAT
EKG 21    Prescription for Bactroban and Chlorhexidine shower called into patient's pharmacy or [facility] pharmacy by patient's surgeon.  Reinforced with patient to  the prescription from applicable pharmacy if they haven't already.  Verbal and written instructions given regarding proper use of the Bactroban and Chlorhexidine to patient and/or famlily during PAT visit. Patient/family also instructed to complete checklist and return it to Pre-op on the day of surgery.  Patient and/or family verbalized understanding. (patient will use medicine from last hip replacement)    Patient instructed to drink 20 ounces (or until full) of Gatorade and it needs to be completed 1 hour (for Main OR patients) or 2 hours (scheduled  section patients) before given arrival time for procedure (NO RED Gatorade)    Patient verbalized understanding.    Patient to apply Chlorhexadine wipes  to surgical area (as instructed) the night before procedure and the AM of procedure. Wipes provided.    An arrival time for procedure was not given during PAT visit. If patient had any questions or concerns about their arrival time, they were instructed to contact their surgeon/physician.  Additionally, if the patient referred to an arrival time that was acquired from their my chart account, patient was encouraged to verify that time with their surgeon/physician.  NO arrival times given in Pre Admission Testing Department.    Clean catch urinalysis not indicated because patient denied recent urinary frequency, urinary urgency, burning or pain upon urination, or flank pain. No recent UTIs.    Discussed with patient options for receiving total joint replacement education and assessed patient's ability and preference. Joint Replacement Guide given to patient during PAT visit since not received a copy within the last year. Encouraged patient/family to read guide thoroughly and notify PAT staff with any questions or concerns. Handout provided  directing patient to links to watch online videos related to joint replacement surgery on the [facility] website. The handout gives detailed instructions for joining an online joint replacement class through Zoom or phone conference offered on Thursdays. Patient agreed to participate by watching videos online. Patient verbalized understanding of instructions and to complete the online learning tool survey. Encouraged to share information with family and/or . An overview of the joint replacement education was provided during the visit including general perioperative instructions that are routine for all surgical patients (PAT PASS, wipes, directions to pre-op, etc.).  (patient watched videos nov 2021 before last surgery)

## 2022-02-21 LAB
COTININE SERPL-MCNC: <1 NG/ML
NICOTINE SERPL-MCNC: <1 NG/ML

## 2022-02-25 ENCOUNTER — CLINICAL SUPPORT NO REQUIREMENTS (OUTPATIENT)
Dept: PREADMISSION TESTING | Facility: HOSPITAL | Age: 72
End: 2022-02-25

## 2022-02-25 ENCOUNTER — ANESTHESIA EVENT (OUTPATIENT)
Dept: PERIOP | Facility: HOSPITAL | Age: 72
End: 2022-02-25

## 2022-02-25 DIAGNOSIS — M16.11 PRIMARY OSTEOARTHRITIS OF RIGHT HIP: ICD-10-CM

## 2022-02-25 LAB — SARS-COV-2 RNA PNL SPEC NAA+PROBE: NOT DETECTED

## 2022-02-25 PROCEDURE — U0004 COV-19 TEST NON-CDC HGH THRU: HCPCS

## 2022-02-25 PROCEDURE — C9803 HOPD COVID-19 SPEC COLLECT: HCPCS

## 2022-02-26 RX ORDER — FAMOTIDINE 10 MG/ML
20 INJECTION, SOLUTION INTRAVENOUS ONCE
Status: CANCELLED | OUTPATIENT
Start: 2022-02-26 | End: 2022-02-26

## 2022-02-28 ENCOUNTER — HOSPITAL ENCOUNTER (OUTPATIENT)
Facility: HOSPITAL | Age: 72
Discharge: HOME | End: 2022-02-28
Attending: ORTHOPAEDIC SURGERY | Admitting: ORTHOPAEDIC SURGERY

## 2022-02-28 ENCOUNTER — ANESTHESIA (OUTPATIENT)
Dept: PERIOP | Facility: HOSPITAL | Age: 72
End: 2022-02-28

## 2022-02-28 ENCOUNTER — APPOINTMENT (OUTPATIENT)
Dept: GENERAL RADIOLOGY | Facility: HOSPITAL | Age: 72
End: 2022-02-28

## 2022-02-28 VITALS
DIASTOLIC BLOOD PRESSURE: 77 MMHG | TEMPERATURE: 97 F | OXYGEN SATURATION: 92 % | SYSTOLIC BLOOD PRESSURE: 149 MMHG | BODY MASS INDEX: 30.88 KG/M2 | HEART RATE: 73 BPM | RESPIRATION RATE: 20 BRPM | WEIGHT: 228 LBS | HEIGHT: 72 IN

## 2022-02-28 DIAGNOSIS — Z96.641 STATUS POST TOTAL REPLACEMENT OF RIGHT HIP: Primary | ICD-10-CM

## 2022-02-28 DIAGNOSIS — M16.11 PRIMARY OSTEOARTHRITIS OF RIGHT HIP: ICD-10-CM

## 2022-02-28 PROBLEM — G47.00 INSOMNIA: Status: ACTIVE | Noted: 2022-02-28

## 2022-02-28 PROBLEM — N28.9 RENAL INSUFFICIENCY: Status: RESOLVED | Noted: 2022-02-28 | Resolved: 2022-02-28

## 2022-02-28 PROBLEM — N28.9 RENAL INSUFFICIENCY: Status: ACTIVE | Noted: 2022-02-28

## 2022-02-28 PROBLEM — K21.9 GERD (GASTROESOPHAGEAL REFLUX DISEASE): Status: ACTIVE | Noted: 2022-02-28

## 2022-02-28 LAB — GLUCOSE BLDC GLUCOMTR-MCNC: 82 MG/DL (ref 70–130)

## 2022-02-28 PROCEDURE — A9270 NON-COVERED ITEM OR SERVICE: HCPCS | Performed by: ORTHOPAEDIC SURGERY

## 2022-02-28 PROCEDURE — 97116 GAIT TRAINING THERAPY: CPT

## 2022-02-28 PROCEDURE — 0 CEFAZOLIN IN DEXTROSE 2-4 GM/100ML-% SOLUTION: Performed by: ORTHOPAEDIC SURGERY

## 2022-02-28 PROCEDURE — 25010000002 PROPOFOL 10 MG/ML EMULSION: Performed by: NURSE ANESTHETIST, CERTIFIED REGISTERED

## 2022-02-28 PROCEDURE — 63710000001 MUPIROCIN 2 % OINTMENT 1 G TUBE: Performed by: ORTHOPAEDIC SURGERY

## 2022-02-28 PROCEDURE — 97161 PT EVAL LOW COMPLEX 20 MIN: CPT

## 2022-02-28 PROCEDURE — S0260 H&P FOR SURGERY: HCPCS | Performed by: ORTHOPAEDIC SURGERY

## 2022-02-28 PROCEDURE — 63710000001 POVIDONE-IODINE 10 % SOLUTION 30 ML BOTTLE: Performed by: ORTHOPAEDIC SURGERY

## 2022-02-28 PROCEDURE — 25010000002 MORPHINE PER 10 MG: Performed by: ORTHOPAEDIC SURGERY

## 2022-02-28 PROCEDURE — 72170 X-RAY EXAM OF PELVIS: CPT

## 2022-02-28 PROCEDURE — A9270 NON-COVERED ITEM OR SERVICE: HCPCS | Performed by: ANESTHESIOLOGY

## 2022-02-28 PROCEDURE — 25010000002 KETOROLAC TROMETHAMINE PER 15 MG: Performed by: ORTHOPAEDIC SURGERY

## 2022-02-28 PROCEDURE — 63710000001 OXYCODONE 5 MG TABLET: Performed by: ORTHOPAEDIC SURGERY

## 2022-02-28 PROCEDURE — 63710000001 ACETAMINOPHEN 500 MG TABLET: Performed by: ORTHOPAEDIC SURGERY

## 2022-02-28 PROCEDURE — C1889 IMPLANT/INSERT DEVICE, NOC: HCPCS | Performed by: ORTHOPAEDIC SURGERY

## 2022-02-28 PROCEDURE — 63710000001 MELOXICAM 15 MG TABLET: Performed by: ORTHOPAEDIC SURGERY

## 2022-02-28 PROCEDURE — 25010000002 DEXAMETHASONE PER 1 MG: Performed by: NURSE ANESTHETIST, CERTIFIED REGISTERED

## 2022-02-28 PROCEDURE — 63710000001 PREGABALIN 75 MG CAPSULE: Performed by: ORTHOPAEDIC SURGERY

## 2022-02-28 PROCEDURE — C1776 JOINT DEVICE (IMPLANTABLE): HCPCS | Performed by: ORTHOPAEDIC SURGERY

## 2022-02-28 PROCEDURE — 82962 GLUCOSE BLOOD TEST: CPT

## 2022-02-28 PROCEDURE — C1755 CATHETER, INTRASPINAL: HCPCS | Performed by: ORTHOPAEDIC SURGERY

## 2022-02-28 PROCEDURE — G0378 HOSPITAL OBSERVATION PER HR: HCPCS

## 2022-02-28 PROCEDURE — 27130 TOTAL HIP ARTHROPLASTY: CPT | Performed by: ORTHOPAEDIC SURGERY

## 2022-02-28 PROCEDURE — 25010000002 ROPIVACAINE PER 1 MG: Performed by: ORTHOPAEDIC SURGERY

## 2022-02-28 PROCEDURE — 25010000002 ONDANSETRON PER 1 MG: Performed by: NURSE ANESTHETIST, CERTIFIED REGISTERED

## 2022-02-28 PROCEDURE — 27130 TOTAL HIP ARTHROPLASTY: CPT | Performed by: PHYSICIAN ASSISTANT

## 2022-02-28 PROCEDURE — 63710000001 FAMOTIDINE 20 MG TABLET: Performed by: ANESTHESIOLOGY

## 2022-02-28 DEVICE — TOTL HIP HI DEMAND STRYKER: Type: IMPLANTABLE DEVICE | Site: HIP | Status: FUNCTIONAL

## 2022-02-28 DEVICE — TRIDENT II TRITANIUM CLUSTER 58F
Type: IMPLANTABLE DEVICE | Site: HIP | Status: FUNCTIONAL
Brand: TRIDENT II

## 2022-02-28 DEVICE — WAX BONE HEMO AESCULAP 2.5GM: Type: IMPLANTABLE DEVICE | Site: HIP | Status: FUNCTIONAL

## 2022-02-28 DEVICE — DEV CONTRL TISS STRATAFIX SPIRAL PDO BIDIR 1 36X36CM: Type: IMPLANTABLE DEVICE | Site: HIP | Status: FUNCTIONAL

## 2022-02-28 DEVICE — TRIDENT X3 0 DEGREE POLYETHYLENE INSERT
Type: IMPLANTABLE DEVICE | Site: HIP | Status: FUNCTIONAL
Brand: TRIDENT X3 INSERT

## 2022-02-28 DEVICE — 127 DEGREE NECK ANGLE HIP STEM
Type: IMPLANTABLE DEVICE | Site: HIP | Status: FUNCTIONAL
Brand: ACCOLADE

## 2022-02-28 DEVICE — DEV CONTRL TISS STRATAFIX SPIRAL PDO BIDIR MO4 36X36CM: Type: IMPLANTABLE DEVICE | Site: HIP | Status: FUNCTIONAL

## 2022-02-28 DEVICE — CERAMIC V40 FEMORAL HEAD
Type: IMPLANTABLE DEVICE | Site: HIP | Status: FUNCTIONAL
Brand: BIOLOX

## 2022-02-28 DEVICE — 6.5MM LOW PROFILE HEX SCREW 35MM
Type: IMPLANTABLE DEVICE | Site: HIP | Status: FUNCTIONAL
Brand: TRIDENT II

## 2022-02-28 RX ORDER — TRANEXAMIC ACID 10 MG/ML
1000 INJECTION, SOLUTION INTRAVENOUS ONCE
Status: COMPLETED | OUTPATIENT
Start: 2022-02-28 | End: 2022-02-28

## 2022-02-28 RX ORDER — FENTANYL CITRATE 50 UG/ML
50 INJECTION, SOLUTION INTRAMUSCULAR; INTRAVENOUS
Status: DISCONTINUED | OUTPATIENT
Start: 2022-02-28 | End: 2022-02-28 | Stop reason: HOSPADM

## 2022-02-28 RX ORDER — HYDROMORPHONE HYDROCHLORIDE 1 MG/ML
0.5 INJECTION, SOLUTION INTRAMUSCULAR; INTRAVENOUS; SUBCUTANEOUS
Status: DISCONTINUED | OUTPATIENT
Start: 2022-02-28 | End: 2022-02-28 | Stop reason: HOSPADM

## 2022-02-28 RX ORDER — ONDANSETRON 4 MG/1
4 TABLET, FILM COATED ORAL EVERY 6 HOURS PRN
Status: DISCONTINUED | OUTPATIENT
Start: 2022-02-28 | End: 2022-02-28 | Stop reason: HOSPADM

## 2022-02-28 RX ORDER — ONDANSETRON 2 MG/ML
4 INJECTION INTRAMUSCULAR; INTRAVENOUS EVERY 6 HOURS PRN
Status: DISCONTINUED | OUTPATIENT
Start: 2022-02-28 | End: 2022-02-28 | Stop reason: HOSPADM

## 2022-02-28 RX ORDER — LABETALOL HYDROCHLORIDE 5 MG/ML
10 INJECTION, SOLUTION INTRAVENOUS EVERY 4 HOURS PRN
Status: DISCONTINUED | OUTPATIENT
Start: 2022-02-28 | End: 2022-02-28 | Stop reason: HOSPADM

## 2022-02-28 RX ORDER — ONDANSETRON 2 MG/ML
INJECTION INTRAMUSCULAR; INTRAVENOUS AS NEEDED
Status: DISCONTINUED | OUTPATIENT
Start: 2022-02-28 | End: 2022-02-28 | Stop reason: SURG

## 2022-02-28 RX ORDER — TRANEXAMIC ACID 10 MG/ML
1000 INJECTION, SOLUTION INTRAVENOUS ONCE
Status: DISCONTINUED | OUTPATIENT
Start: 2022-02-28 | End: 2022-02-28 | Stop reason: HOSPADM

## 2022-02-28 RX ORDER — ASPIRIN 325 MG
325 TABLET, DELAYED RELEASE (ENTERIC COATED) ORAL EVERY 12 HOURS SCHEDULED
Status: DISCONTINUED | OUTPATIENT
Start: 2022-03-01 | End: 2022-02-28 | Stop reason: HOSPADM

## 2022-02-28 RX ORDER — ACETAMINOPHEN 500 MG
1000 TABLET ORAL EVERY 8 HOURS SCHEDULED
Status: DISCONTINUED | OUTPATIENT
Start: 2022-02-28 | End: 2022-02-28 | Stop reason: HOSPADM

## 2022-02-28 RX ORDER — MAGNESIUM HYDROXIDE 1200 MG/15ML
LIQUID ORAL AS NEEDED
Status: DISCONTINUED | OUTPATIENT
Start: 2022-02-28 | End: 2022-02-28 | Stop reason: HOSPADM

## 2022-02-28 RX ORDER — NALOXONE HCL 0.4 MG/ML
0.1 VIAL (ML) INJECTION
Status: DISCONTINUED | OUTPATIENT
Start: 2022-02-28 | End: 2022-02-28 | Stop reason: HOSPADM

## 2022-02-28 RX ORDER — KETOROLAC TROMETHAMINE 30 MG/ML
30 INJECTION, SOLUTION INTRAMUSCULAR; INTRAVENOUS ONCE
Status: DISCONTINUED | OUTPATIENT
Start: 2022-02-28 | End: 2022-02-28

## 2022-02-28 RX ORDER — MIDAZOLAM HYDROCHLORIDE 1 MG/ML
0.5 INJECTION INTRAMUSCULAR; INTRAVENOUS
Status: DISCONTINUED | OUTPATIENT
Start: 2022-02-28 | End: 2022-02-28 | Stop reason: HOSPADM

## 2022-02-28 RX ORDER — PANTOPRAZOLE SODIUM 40 MG/1
40 TABLET, DELAYED RELEASE ORAL DAILY
Status: DISCONTINUED | OUTPATIENT
Start: 2022-03-01 | End: 2022-02-28 | Stop reason: HOSPADM

## 2022-02-28 RX ORDER — EPHEDRINE SULFATE 50 MG/ML
INJECTION, SOLUTION INTRAVENOUS AS NEEDED
Status: DISCONTINUED | OUTPATIENT
Start: 2022-02-28 | End: 2022-02-28 | Stop reason: SURG

## 2022-02-28 RX ORDER — ACETAMINOPHEN 500 MG
1000 TABLET ORAL ONCE
Status: COMPLETED | OUTPATIENT
Start: 2022-02-28 | End: 2022-02-28

## 2022-02-28 RX ORDER — PROPOFOL 10 MG/ML
VIAL (ML) INTRAVENOUS AS NEEDED
Status: DISCONTINUED | OUTPATIENT
Start: 2022-02-28 | End: 2022-02-28 | Stop reason: SURG

## 2022-02-28 RX ORDER — MELOXICAM 15 MG/1
15 TABLET ORAL ONCE
Status: COMPLETED | OUTPATIENT
Start: 2022-02-28 | End: 2022-02-28

## 2022-02-28 RX ORDER — SODIUM CHLORIDE 0.9 % (FLUSH) 0.9 %
10 SYRINGE (ML) INJECTION EVERY 12 HOURS SCHEDULED
Status: DISCONTINUED | OUTPATIENT
Start: 2022-02-28 | End: 2022-02-28 | Stop reason: HOSPADM

## 2022-02-28 RX ORDER — MELOXICAM 7.5 MG/1
15 TABLET ORAL DAILY
Status: DISCONTINUED | OUTPATIENT
Start: 2022-03-01 | End: 2022-02-28 | Stop reason: HOSPADM

## 2022-02-28 RX ORDER — BUPIVACAINE HYDROCHLORIDE 5 MG/ML
INJECTION, SOLUTION PERINEURAL
Status: COMPLETED | OUTPATIENT
Start: 2022-02-28 | End: 2022-02-28

## 2022-02-28 RX ORDER — ASPIRIN 81 MG/1
81 TABLET ORAL 2 TIMES DAILY
Qty: 60 TABLET | Refills: 0 | Status: SHIPPED | OUTPATIENT
Start: 2022-02-28

## 2022-02-28 RX ORDER — IBUPROFEN 800 MG/1
800 TABLET ORAL AS NEEDED
Status: CANCELLED
Start: 2022-02-28

## 2022-02-28 RX ORDER — CEFAZOLIN SODIUM 2 G/100ML
2 INJECTION, SOLUTION INTRAVENOUS EVERY 8 HOURS
Status: DISCONTINUED | OUTPATIENT
Start: 2022-02-28 | End: 2022-02-28 | Stop reason: HOSPADM

## 2022-02-28 RX ORDER — PREGABALIN 75 MG/1
75 CAPSULE ORAL ONCE
Status: COMPLETED | OUTPATIENT
Start: 2022-02-28 | End: 2022-02-28

## 2022-02-28 RX ORDER — LIDOCAINE HYDROCHLORIDE 10 MG/ML
0.5 INJECTION, SOLUTION EPIDURAL; INFILTRATION; INTRACAUDAL; PERINEURAL ONCE AS NEEDED
Status: COMPLETED | OUTPATIENT
Start: 2022-02-28 | End: 2022-02-28

## 2022-02-28 RX ORDER — SODIUM CHLORIDE, SODIUM LACTATE, POTASSIUM CHLORIDE, CALCIUM CHLORIDE 600; 310; 30; 20 MG/100ML; MG/100ML; MG/100ML; MG/100ML
9 INJECTION, SOLUTION INTRAVENOUS CONTINUOUS
Status: DISCONTINUED | OUTPATIENT
Start: 2022-02-28 | End: 2022-02-28 | Stop reason: HOSPADM

## 2022-02-28 RX ORDER — ALPRAZOLAM 1 MG/1
1 TABLET ORAL NIGHTLY PRN
Status: DISCONTINUED | OUTPATIENT
Start: 2022-02-28 | End: 2022-02-28 | Stop reason: HOSPADM

## 2022-02-28 RX ORDER — ONDANSETRON 4 MG/1
4 TABLET, FILM COATED ORAL EVERY 8 HOURS PRN
Qty: 20 TABLET | Refills: 0 | Status: SHIPPED | OUTPATIENT
Start: 2022-02-28

## 2022-02-28 RX ORDER — DOCUSATE SODIUM 100 MG/1
100 CAPSULE, LIQUID FILLED ORAL 2 TIMES DAILY
Qty: 30 CAPSULE | Refills: 0 | Status: SHIPPED | OUTPATIENT
Start: 2022-02-28 | End: 2022-03-15

## 2022-02-28 RX ORDER — SODIUM CHLORIDE 9 MG/ML
100 INJECTION, SOLUTION INTRAVENOUS CONTINUOUS
Status: DISCONTINUED | OUTPATIENT
Start: 2022-02-28 | End: 2022-02-28 | Stop reason: HOSPADM

## 2022-02-28 RX ORDER — SODIUM CHLORIDE 0.9 % (FLUSH) 0.9 %
10 SYRINGE (ML) INJECTION AS NEEDED
Status: DISCONTINUED | OUTPATIENT
Start: 2022-02-28 | End: 2022-02-28 | Stop reason: HOSPADM

## 2022-02-28 RX ORDER — CEFAZOLIN SODIUM 2 G/100ML
2 INJECTION, SOLUTION INTRAVENOUS ONCE
Status: COMPLETED | OUTPATIENT
Start: 2022-02-28 | End: 2022-02-28

## 2022-02-28 RX ORDER — FAMOTIDINE 20 MG/1
20 TABLET, FILM COATED ORAL ONCE
Status: COMPLETED | OUTPATIENT
Start: 2022-02-28 | End: 2022-02-28

## 2022-02-28 RX ORDER — DEXAMETHASONE SODIUM PHOSPHATE 4 MG/ML
INJECTION, SOLUTION INTRA-ARTICULAR; INTRALESIONAL; INTRAMUSCULAR; INTRAVENOUS; SOFT TISSUE AS NEEDED
Status: DISCONTINUED | OUTPATIENT
Start: 2022-02-28 | End: 2022-02-28 | Stop reason: SURG

## 2022-02-28 RX ORDER — OXYCODONE HYDROCHLORIDE 5 MG/1
10 TABLET ORAL EVERY 4 HOURS PRN
Status: DISCONTINUED | OUTPATIENT
Start: 2022-02-28 | End: 2022-02-28 | Stop reason: HOSPADM

## 2022-02-28 RX ORDER — LIDOCAINE HYDROCHLORIDE 10 MG/ML
INJECTION, SOLUTION EPIDURAL; INFILTRATION; INTRACAUDAL; PERINEURAL AS NEEDED
Status: DISCONTINUED | OUTPATIENT
Start: 2022-02-28 | End: 2022-02-28 | Stop reason: SURG

## 2022-02-28 RX ORDER — OXYCODONE HYDROCHLORIDE 5 MG/1
5 TABLET ORAL EVERY 4 HOURS PRN
Qty: 40 TABLET | Refills: 0 | Status: SHIPPED | OUTPATIENT
Start: 2022-02-28

## 2022-02-28 RX ORDER — OXYCODONE HYDROCHLORIDE 5 MG/1
5 TABLET ORAL EVERY 4 HOURS PRN
Status: DISCONTINUED | OUTPATIENT
Start: 2022-02-28 | End: 2022-02-28 | Stop reason: HOSPADM

## 2022-02-28 RX ADMIN — PREGABALIN 75 MG: 75 CAPSULE ORAL at 08:18

## 2022-02-28 RX ADMIN — PROPOFOL 100 MCG/KG/MIN: 10 INJECTION, EMULSION INTRAVENOUS at 10:12

## 2022-02-28 RX ADMIN — PROPOFOL 50 MG: 10 INJECTION, EMULSION INTRAVENOUS at 10:09

## 2022-02-28 RX ADMIN — TRANEXAMIC ACID 1000 MG: 10 INJECTION, SOLUTION INTRAVENOUS at 11:38

## 2022-02-28 RX ADMIN — BUPIVACAINE HYDROCHLORIDE 2 ML: 5 INJECTION, SOLUTION PERINEURAL at 10:11

## 2022-02-28 RX ADMIN — MELOXICAM 15 MG: 15 TABLET ORAL at 08:19

## 2022-02-28 RX ADMIN — OXYCODONE HYDROCHLORIDE 5 MG: 5 TABLET ORAL at 16:42

## 2022-02-28 RX ADMIN — SODIUM CHLORIDE, POTASSIUM CHLORIDE, SODIUM LACTATE AND CALCIUM CHLORIDE 9 ML/HR: 600; 310; 30; 20 INJECTION, SOLUTION INTRAVENOUS at 08:18

## 2022-02-28 RX ADMIN — EPHEDRINE SULFATE 10 MG: 50 INJECTION INTRAVENOUS at 11:01

## 2022-02-28 RX ADMIN — FAMOTIDINE 20 MG: 20 TABLET, FILM COATED ORAL at 08:18

## 2022-02-28 RX ADMIN — EPHEDRINE SULFATE 10 MG: 50 INJECTION INTRAVENOUS at 11:11

## 2022-02-28 RX ADMIN — EPHEDRINE SULFATE 10 MG: 50 INJECTION INTRAVENOUS at 10:39

## 2022-02-28 RX ADMIN — TRANEXAMIC ACID 1000 MG: 10 INJECTION, SOLUTION INTRAVENOUS at 10:20

## 2022-02-28 RX ADMIN — LIDOCAINE HYDROCHLORIDE 0.5 ML: 10 INJECTION, SOLUTION EPIDURAL; INFILTRATION; INTRACAUDAL; PERINEURAL at 08:18

## 2022-02-28 RX ADMIN — CEFAZOLIN SODIUM 2 G: 2 INJECTION, SOLUTION INTRAVENOUS at 10:08

## 2022-02-28 RX ADMIN — DEXAMETHASONE SODIUM PHOSPHATE 8 MG: 4 INJECTION, SOLUTION INTRA-ARTICULAR; INTRALESIONAL; INTRAMUSCULAR; INTRAVENOUS; SOFT TISSUE at 10:15

## 2022-02-28 RX ADMIN — EPHEDRINE SULFATE 10 MG: 50 INJECTION INTRAVENOUS at 10:51

## 2022-02-28 RX ADMIN — EPHEDRINE SULFATE 10 MG: 50 INJECTION INTRAVENOUS at 10:37

## 2022-02-28 RX ADMIN — ONDANSETRON 4 MG: 2 INJECTION INTRAMUSCULAR; INTRAVENOUS at 12:10

## 2022-02-28 RX ADMIN — CEFAZOLIN SODIUM 2 G: 2 INJECTION, SOLUTION INTRAVENOUS at 16:42

## 2022-02-28 RX ADMIN — SODIUM CHLORIDE, POTASSIUM CHLORIDE, SODIUM LACTATE AND CALCIUM CHLORIDE: 600; 310; 30; 20 INJECTION, SOLUTION INTRAVENOUS at 11:32

## 2022-02-28 RX ADMIN — LIDOCAINE HYDROCHLORIDE 50 MG: 10 INJECTION, SOLUTION EPIDURAL; INFILTRATION; INTRACAUDAL; PERINEURAL at 10:09

## 2022-02-28 RX ADMIN — MUPIROCIN 1 APPLICATION: 20 OINTMENT TOPICAL at 08:18

## 2022-02-28 RX ADMIN — ACETAMINOPHEN 1000 MG: 500 TABLET ORAL at 08:18

## 2022-02-28 NOTE — ANESTHESIA POSTPROCEDURE EVALUATION
Patient: Adonay Eason    Procedure Summary     Date: 02/28/22 Room / Location:  [facility]    Anesthesia Start: 1007 Anesthesia Stop:     Procedure: TOTAL HIP ARTHROPLASTY RIGHT (Right Hip) Diagnosis:       Primary osteoarthritis of right hip      (Primary osteoarthritis of right hip [M16.11])    Surgeons: Omi Osorio MD Provider: Elver Cano MD    Anesthesia Type: spinal ASA Status: 3          Anesthesia Type: spinal    Vitals  Vitals Value Taken Time   BP     Temp     Pulse 68 02/28/22 1224   Resp     SpO2 97 % 02/28/22 1224   Vitals shown include unvalidated device data.        Post Anesthesia Care and Evaluation    Patient location during evaluation: PACU  Patient participation: complete - patient participated  Level of consciousness: awake and alert  Pain management: adequate  Airway patency: patent  Anesthetic complications: No anesthetic complications  PONV Status: none  Cardiovascular status: hemodynamically stable and acceptable  Respiratory status: nonlabored ventilation, acceptable and nasal cannula  Hydration status: acceptable

## 2022-02-28 NOTE — ANESTHESIA PREPROCEDURE EVALUATION
Anesthesia Evaluation     Patient summary reviewed and Nursing notes reviewed                Airway   Mallampati: I  TM distance: >3 FB  Neck ROM: full  No difficulty expected  Dental - normal exam     Pulmonary - normal exam   (+) a smoker Former, sleep apnea,   Cardiovascular - negative cardio ROS and normal exam        Neuro/Psych  (+) headaches, psychiatric history Anxiety,    GI/Hepatic/Renal/Endo    (+)  GERD,      Musculoskeletal     (+) back pain,   Abdominal  - normal exam    Bowel sounds: normal.   Substance History - negative use     OB/GYN negative ob/gyn ROS         Other   arthritis,                      Anesthesia Plan    ASA 3     spinal     intravenous induction     Anesthetic plan, all risks, benefits, and alternatives have been provided, discussed and informed consent has been obtained with: patient.    Plan discussed with CRNA.        CODE STATUS:

## 2022-02-28 NOTE — ANESTHESIA PROCEDURE NOTES
Spinal Block      Patient reassessed immediately prior to procedure    Patient location during procedure: OR  Indication:at surgeon's request  Performed By  CRNA: Basilio Caceres CRNA  Preanesthetic Checklist  Completed: patient identified, IV checked, site marked, risks and benefits discussed, surgical consent, monitors and equipment checked, pre-op evaluation and timeout performed  Spinal Block Prep:  Patient Position:sitting  Sterile Tech:cap, gloves, sterile barriers and mask  Prep:Chloraprep  Patient Monitoring:blood pressure monitoring, continuous pulse oximetry and EKG  Spinal Block Procedure  Approach:midline  Guidance:landmark technique and palpation technique  Location:L4-L5  Needle Type:Sprotte  Needle Gauge:25 G  Placement of Spinal needle event:cerebrospinal fluid aspirated  Paresthesia: no  Fluid Appearance:clear  Medications: bupivacaine (MARCAINE) 0.5 % injection, 2 mL   Post Assessment  Patient Tolerance:patient tolerated the procedure well with no apparent complications  Complications no  Additional Notes  Procedure:  Pt assisted to sitting position, with legs in position of comfort over side of bed.  Pt. instructed in optimal spine presentation, the spine was prepped/ Draped and the skin at insertion site was anesthetized with 1% Lidocaine 2 ml.  The spinal needle was then advanced until CSF flow was obtained and LA was injected:

## 2022-03-01 ENCOUNTER — HOME HEALTH ADMISSION (OUTPATIENT)
Dept: HOME HEALTH SERVICES | Facility: HOME HEALTHCARE | Age: 72
End: 2022-03-01

## 2022-03-01 NOTE — CASE MANAGEMENT/SOCIAL WORK
Case Management Discharge Note      Final Note: Plan is home with [facility] PT. Pattie at [facility] accepted patient.         Selected Continued Care - Discharged on 2/28/2022 Admission date: 2/28/2022 - Discharge disposition: Home or Self Care    Destination    No services have been selected for the patient.              Durable Medical Equipment    No services have been selected for the patient.              Dialysis/Infusion    No services have been selected for the patient.              Home Medical Care Coordination complete.    Service Provider Selected Services Address Phone Fax Patient Preferred    [facility]  Home Health Services [address] [address] [phone] --          Therapy    No services have been selected for the patient.              Community Resources    No services have been selected for the patient.              Community & DME    No services have been selected for the patient.                Selected Continued Care - Prior Encounters Includes selections from prior encounters from 11/30/2021 to 2/28/2022    Discharged on 12/1/2021 Admission date: 12/1/2021 - Discharge disposition: Home or Self Care    Therapy     Service Provider Selected Services Address Phone Fax Patient Preferred    [facility]  [facility] [address] [facility] [address] [phone] --                         Final Discharge Disposition Code: 06 - home with home health care

## 2022-03-02 ENCOUNTER — HOME CARE VISIT (OUTPATIENT)
Dept: HOME HEALTH SERVICES | Facility: HOME HEALTHCARE | Age: 72
End: 2022-03-02

## 2022-03-02 VITALS
RESPIRATION RATE: 16 BRPM | OXYGEN SATURATION: 97 % | TEMPERATURE: 98.2 F | SYSTOLIC BLOOD PRESSURE: 118 MMHG | DIASTOLIC BLOOD PRESSURE: 76 MMHG | HEART RATE: 70 BPM

## 2022-03-02 PROCEDURE — G0151 HHCP-SERV OF PT,EA 15 MIN: HCPCS

## 2022-03-03 NOTE — HOME HEALTH
Pt is a 70 yo male who underwent R posterolateral JEAN PAUL on 2/28/22 per Dr Osorio at [facility].  Pt lives at home with his spouse in a two story home, 4 steps at entry (no rail).  Pt is currently staying on 2nd floor and his wife assists as needed.  Pt uses a FWW for mobility in his home, has a cane, BSC and a shower chair.  PMH includes: L JEAN PAUL 12/21, OA, anxiety, GERD, insomnia, migraines, panic attacks and RLS.  Pt to be seen by HHPT 2wk3, 1wk1 then will transition to outpatient PT

## 2022-03-04 ENCOUNTER — HOME CARE VISIT (OUTPATIENT)
Dept: HOME HEALTH SERVICES | Facility: HOME HEALTHCARE | Age: 72
End: 2022-03-04

## 2022-03-04 VITALS
SYSTOLIC BLOOD PRESSURE: 134 MMHG | RESPIRATION RATE: 16 BRPM | HEART RATE: 71 BPM | TEMPERATURE: 97.3 F | OXYGEN SATURATION: 94 % | DIASTOLIC BLOOD PRESSURE: 82 MMHG

## 2022-03-04 PROCEDURE — G0151 HHCP-SERV OF PT,EA 15 MIN: HCPCS

## 2022-03-06 NOTE — HOME HEALTH
Pt reports R hip pain a little better today, reports he has been walking every 2 hours.  Pt participated in gait training with FWW, therapeutic exercise and HEP instruction.  Plan to progress ther ex as pt tolerates

## 2022-03-08 ENCOUNTER — HOME CARE VISIT (OUTPATIENT)
Dept: HOME HEALTH SERVICES | Facility: HOME HEALTHCARE | Age: 72
End: 2022-03-08

## 2022-03-08 VITALS
HEART RATE: 63 BPM | OXYGEN SATURATION: 98 % | TEMPERATURE: 98.6 F | RESPIRATION RATE: 17 BRPM | DIASTOLIC BLOOD PRESSURE: 84 MMHG | SYSTOLIC BLOOD PRESSURE: 126 MMHG

## 2022-03-08 PROCEDURE — G0157 HHC PT ASSISTANT EA 15: HCPCS

## 2022-03-10 ENCOUNTER — HOME CARE VISIT (OUTPATIENT)
Dept: HOME HEALTH SERVICES | Facility: HOME HEALTHCARE | Age: 72
End: 2022-03-10

## 2022-03-10 VITALS
HEART RATE: 69 BPM | DIASTOLIC BLOOD PRESSURE: 88 MMHG | RESPIRATION RATE: 16 BRPM | TEMPERATURE: 98.6 F | SYSTOLIC BLOOD PRESSURE: 150 MMHG | OXYGEN SATURATION: 98 %

## 2022-03-10 PROCEDURE — G0157 HHC PT ASSISTANT EA 15: HCPCS

## 2022-03-10 NOTE — HOME HEALTH
Physical therapy addressed gait training, transfers, and LE strengthening exercises today.  Plan to initiate cane training and upgrade HEP next visit if tolerated.

## 2022-03-14 ENCOUNTER — HOME CARE VISIT (OUTPATIENT)
Dept: HOME HEALTH SERVICES | Facility: HOME HEALTHCARE | Age: 72
End: 2022-03-14

## 2022-03-14 VITALS
RESPIRATION RATE: 16 BRPM | DIASTOLIC BLOOD PRESSURE: 78 MMHG | OXYGEN SATURATION: 94 % | SYSTOLIC BLOOD PRESSURE: 138 MMHG | TEMPERATURE: 98.6 F | HEART RATE: 66 BPM

## 2022-03-14 PROCEDURE — G0157 HHC PT ASSISTANT EA 15: HCPCS

## 2022-03-14 NOTE — HOME HEALTH
Physical therapy addressed gait training w/ spc and upgraded HEP today.  Plan to enter/exit home next visit weather permitting.

## 2022-03-16 ENCOUNTER — HOME CARE VISIT (OUTPATIENT)
Dept: HOME HEALTH SERVICES | Facility: HOME HEALTHCARE | Age: 72
End: 2022-03-16

## 2022-03-16 VITALS
DIASTOLIC BLOOD PRESSURE: 80 MMHG | OXYGEN SATURATION: 96 % | TEMPERATURE: 97.9 F | RESPIRATION RATE: 16 BRPM | SYSTOLIC BLOOD PRESSURE: 148 MMHG | HEART RATE: 76 BPM

## 2022-03-16 PROCEDURE — G0157 HHC PT ASSISTANT EA 15: HCPCS

## 2022-03-17 PROCEDURE — G0180 MD CERTIFICATION HHA PATIENT: HCPCS | Performed by: ORTHOPAEDIC SURGERY

## 2022-03-18 NOTE — HOME HEALTH
Physical therapy addressed education regarding s/s of infection with patient and wife, therapuetic exercises and gait training.  Plan to discharge to OP or self care next visit.

## 2022-03-22 ENCOUNTER — OFFICE VISIT (OUTPATIENT)
Dept: ORTHOPEDIC SURGERY | Facility: CLINIC | Age: 72
End: 2022-03-22

## 2022-03-22 VITALS — TEMPERATURE: 97.3 F

## 2022-03-22 DIAGNOSIS — Z09 SURGERY FOLLOW-UP: Primary | ICD-10-CM

## 2022-03-22 PROCEDURE — 99024 POSTOP FOLLOW-UP VISIT: CPT | Performed by: PHYSICIAN ASSISTANT

## 2022-03-22 NOTE — PROGRESS NOTES
[facility] Orthopaedic Surgery Clinic Note      Subjective     CC: Post-op (3 weeks status post Total Hip Arthroplasty Right - 2/28/22)      HPI    Adoany Eason is a 71 y.o. male.  Patient presents today 3 weeks status post right total hip arthroplasty with Dr. Osorio.  He reports pain is much better than it was prior to surgery.  He is doing physical therapy and taking Tylenol for pain.  He has been happy with his progress.    Overall, patient's symptoms are improved    ROS:    Constiutional:Pt denies fever, chills, nausea, or vomiting.  MSK:as above        Objective      Past Medical History  Past Medical History:   Diagnosis Date   • Arthritis    • Back pain    • TENA (generalized anxiety disorder)    • GERD (gastroesophageal reflux disease)    • Insomnia    • Migraines    • Panic attacks    • RLS (restless legs syndrome)    • Seasonal allergies    • Wears glasses     reading glasses         Physical Exam  Temp 97.3 °F (36.3 °C)     There is no height or weight on file to calculate BMI.    Patient is well nourished and well developed.        Ortho Exam  Right hip exam: Posterior hip incision is healing well.  No pain with hip motion.  Neurovascular intact distally.      Assessment    Assessment:  1. Surgery follow-up        Plan:  1. Recommend over the counter anti-inflammatories for pain and/or swelling  2. Doing well status post right total hip arthroplasty with Dr. Osorio on 2/28/2022.  X-rays today show a position total hip arthroplasty with no evidence of osteolysis, subsidence or fracture.  Plan is patient continue with his physical therapy.  He will return to see Dr. Osorio in 3 weeks with repeat AP pelvis or sooner if needed.      Roopa Cantu PA-C  03/23/22  17:21 EDT      Dragon disclaimer:  Much of this encounter note is an electronic transcription/translation of spoken language to printed text. The electronic translation of spoken language may permit erroneous, or at times,  nonsensical words or phrases to be inadvertently transcribed; Although I have reviewed the note for such errors, some may still exist.

## 2022-03-23 ENCOUNTER — HOME CARE VISIT (OUTPATIENT)
Dept: HOME HEALTH SERVICES | Facility: HOME HEALTHCARE | Age: 72
End: 2022-03-23

## 2022-03-23 VITALS
HEART RATE: 64 BPM | TEMPERATURE: 97 F | OXYGEN SATURATION: 97 % | SYSTOLIC BLOOD PRESSURE: 134 MMHG | DIASTOLIC BLOOD PRESSURE: 82 MMHG | RESPIRATION RATE: 16 BRPM

## 2022-03-23 PROCEDURE — G0151 HHCP-SERV OF PT,EA 15 MIN: HCPCS

## 2022-03-25 NOTE — HOME HEALTH
"Pt had follow up with surgeon 3/22, released from therapy.  Pt reports he is \"feeling good\"; no longer using cane for mobility.  PT assessed pt progress toward goals, reviewed advanced HEP and completed D/C OASIS."

## 2022-03-28 ENCOUNTER — TELEPHONE (OUTPATIENT)
Dept: ORTHOPEDIC SURGERY | Facility: CLINIC | Age: 72
End: 2022-03-28

## 2022-03-28 NOTE — TELEPHONE ENCOUNTER
I sent pics to Dr. Osorio and he wants patient to come in tomorrow morning to see Roopa GRAFF.  I spoke with the patient and made a 10:10 appointment.    Vikki Cantu

## 2022-03-28 NOTE — TELEPHONE ENCOUNTER
Patient had a possible stitch abscess and called the on call doctor this weekend.  She did remove the stitch, but she states pus is still coming from area.  She is going to send a picture and I told her I would forward it to Dr. Osorio to see how he wants to proceed and then I will give her a call back.     Call back number: [phone]

## 2022-03-29 ENCOUNTER — OFFICE VISIT (OUTPATIENT)
Dept: ORTHOPEDIC SURGERY | Facility: CLINIC | Age: 72
End: 2022-03-29

## 2022-03-29 DIAGNOSIS — Z09 SURGERY FOLLOW-UP: Primary | ICD-10-CM

## 2022-03-29 DIAGNOSIS — Z96.642 STATUS POST LEFT HIP REPLACEMENT: ICD-10-CM

## 2022-03-29 PROCEDURE — 99024 POSTOP FOLLOW-UP VISIT: CPT | Performed by: PHYSICIAN ASSISTANT

## 2022-03-29 RX ORDER — MELOXICAM 15 MG/1
15 TABLET ORAL DAILY
Qty: 30 TABLET | Refills: 2 | Status: SHIPPED | OUTPATIENT
Start: 2022-03-29

## 2022-03-29 RX ORDER — CEPHALEXIN 500 MG/1
500 CAPSULE ORAL EVERY 6 HOURS
Qty: 40 CAPSULE | Refills: 0 | Status: SHIPPED | OUTPATIENT
Start: 2022-03-29

## 2022-03-29 NOTE — PROGRESS NOTES
[facility] [place] [facility] Note        Subjective     CC: Post-op Follow-up (4 weeks s/p Total Hip Arthroplasty Right 2/28/22, Stitch Abscess)      HPI    Adonay Eason is a 71 y.o. male.  Patient returns today early for his right hip.  He is having increased pain with concerns of erythema at the incision.  He complains of anterior thigh pain and lateral pain with weightbearing and walking.  He is doing very well and then all of a sudden 1 day woke up with increased pain.  He and his wife do report that he has been more active the day before.  He is not having groin pain.  No fever chills constitutional symptoms        ROS:    Constiutional:Pt denies fever, chills, nausea, or vomiting.  MSK:as above        Objective      Past Medical History  Past Medical History:   Diagnosis Date   • Arthritis    • Back pain    • TENA (generalized anxiety disorder)    • GERD (gastroesophageal reflux disease)    • Insomnia    • Migraines    • Panic attacks    • RLS (restless legs syndrome)    • Seasonal allergies    • Wears glasses     reading glasses         Physical Exam  There were no vitals taken for this visit.    There is no height or weight on file to calculate BMI.    Patient is well nourished and well developed.        Ortho Exam  Right hip exam: Posterior hip incision is healing well.  Most distal aspect has a small stitch abscess with mild erythema around it.  No drainage.  No pain with hip motion.  Mildly tender over the IT band and quad.  Patient able to stand single leg on the right.  Ambulating with a cane.    Imaging/Labs/EMG Reviewed:  Imaging Results (Last 24 Hours)     ** No results found for the last 24 hours. **            Assessment    Assessment:  1. Surgery follow-up    2. Status post left hip replacement        Plan:  1. Recommend over the counter anti-inflammatories for pain and/or swelling  2. Doing well status post left total hip arthroplasty.  I reassured them that the incision looks  good.  It was a stitch abscess.  We will prophylactically place him on some Keflex.  Patient's increased pain sounds as though it is muscular in nature likely from overdoing it.  He has no pain with hip motion.  We recommended he back off on his activity a bit.  He will return to see Dr. Osorio as scheduled or sooner if needed.  I have also given prescription for meloxicam.      Roopa Cantu PA-C  03/30/22  14:24 EDT

## 2022-04-12 ENCOUNTER — OFFICE VISIT (OUTPATIENT)
Dept: ORTHOPEDIC SURGERY | Facility: CLINIC | Age: 72
End: 2022-04-12

## 2022-04-12 DIAGNOSIS — Z96.641 STATUS POST RIGHT HIP REPLACEMENT: Primary | ICD-10-CM

## 2022-04-12 DIAGNOSIS — T81.41XA POSTOPERATIVE STITCH ABSCESS: ICD-10-CM

## 2022-04-12 DIAGNOSIS — Z09 SURGERY FOLLOW-UP: ICD-10-CM

## 2022-04-12 PROCEDURE — 99024 POSTOP FOLLOW-UP VISIT: CPT | Performed by: ORTHOPAEDIC SURGERY

## 2022-04-12 NOTE — PROGRESS NOTES
Orthopaedic Clinic Note:  Hip Post Op    Chief Complaint   Patient presents with   • Post-op     6 weeks s/p Total Hip Arthroplasty Right 2/28/22, Stitch Abscess        HPI    Mr. Eason is 6  week(s) s/p right total hip arthroplasty.  At his last visit, he had a small stitch abscess that was removed at the inferior portion of his incision.  That has since resolved but he is now having some redness just proximal to this previous area.  Rates pain 3/10 on the pain scale.  Ambulatory no assistive device.  Denies any pain in the groin.  Denies fevers chills.    Past Medical History:   Diagnosis Date   • Arthritis    • Back pain    • TENA (generalized anxiety disorder)    • GERD (gastroesophageal reflux disease)    • Insomnia    • Migraines    • Panic attacks    • RLS (restless legs syndrome)    • Seasonal allergies    • Wears glasses     reading glasses      Past Surgical History:   Procedure Laterality Date   • ANAL FISTULA REPAIR  2009   • CERVICAL DISCECTOMY ANTERIOR  2000   • COLONOSCOPY  2009   • KNEE CARTILAGE SURGERY Right 1977   • KNEE SURGERY Left 2000   • LUMBAR DISCECTOMY  2011   • SHOULDER ROTATOR CUFF REPAIR Right 2019   • TOTAL HIP ARTHROPLASTY Left 12/1/2021    Procedure: TOTAL HIP ARTHROPLASTY LEFT;  Surgeon: Omi Osorio MD;  Location:  [facility] OR;  Service: Orthopedics;  Laterality: Left;   • TOTAL HIP ARTHROPLASTY Right 2/28/2022    Procedure: TOTAL HIP ARTHROPLASTY RIGHT;  Surgeon: Omi Osorio MD;  Location:  [facility] OR;  Service: Orthopedics;  Laterality: Right;      Family History   Problem Relation Age of Onset   • Dementia Mother    • Cholecystitis Father      Social History     Socioeconomic History   • Marital status:      Spouse name: Yoon   • Number of children: 0   • Years of education: H.S.   • Highest education level: High school graduate   Tobacco Use   • Smoking status: Former Smoker     Packs/day: 1.00     Years: 10.00     Pack years: 10.00     Types: Cigarettes      Quit date:      Years since quittin.3   • Smokeless tobacco: Never Used   Vaping Use   • Vaping Use: Never used   Substance and Sexual Activity   • Alcohol use: Not Currently     Comment: QUIT 2+ YRS AGO   • Drug use: No   • Sexual activity: Defer     Partners: Female      Current Outpatient Medications on File Prior to Visit   Medication Sig Dispense Refill   • acetaminophen (TYLENOL) 500 MG tablet Take 2 tablets by mouth Every 8 (Eight) Hours. For 1 week, then as needed 42 tablet 0   • ALPRAZolam (XANAX) 1 MG tablet Take 1-2 mg by mouth At Night As Needed for Anxiety or Sleep.     • aspirin (ASPIR) 81 MG EC tablet Take 1 tablet by mouth 2 (Two) Times a Day. 60 tablet 0   • cephalexin (KEFLEX) 500 MG capsule Take 1 capsule by mouth Every 6 (Six) Hours. 40 capsule 0   • diphenhydrAMINE HCl (ZzzQuil) 50 MG/30ML liquid Take  by mouth At Night As Needed (SLEEP).     • esomeprazole (nexIUM) 40 MG capsule Take 40 mg by mouth Every Morning Before Breakfast.     • ibuprofen (ADVIL,MOTRIN) 800 MG tablet Take 800 mg by mouth As Needed.     • meloxicam (MOBIC) 15 MG tablet Take 1 tablet by mouth Daily. 30 tablet 2   • ondansetron (Zofran) 4 MG tablet Take 1 tablet by mouth Every 8 (Eight) Hours As Needed for Nausea or Vomiting for up to 20 doses. 20 tablet 0   • oxyCODONE (ROXICODONE) 5 MG immediate release tablet Take 1 tablet by mouth Every 4 (Four) Hours As Needed for Moderate Pain . 40 tablet 0   • pramipexole (MIRAPEX) 1.5 MG tablet Take 1.5 mg by mouth 3 (Three) Times a Day.       No current facility-administered medications on file prior to visit.      No Known Allergies     Review of Systems   Constitutional: Negative.    HENT: Negative.    Eyes: Negative.    Respiratory: Negative.    Cardiovascular: Negative.    Gastrointestinal: Negative.    Endocrine: Negative.    Genitourinary: Negative.    Musculoskeletal: Positive for arthralgias.   Skin: Negative.    Allergic/Immunologic: Negative.    Neurological:  Negative.    Hematological: Negative.    Psychiatric/Behavioral: Negative.         Physical Exam  There were no vitals taken for this visit.    There is no height or weight on file to calculate BMI.    GENERAL APPEARANCE: awake, alert, oriented, in no acute distress and well developed, well nourished  LUNGS:  breathing nonlabored  EXTREMITIES: no clubbing, cyanosis  PERIPHERAL PULSES: palpable dorsalis pedis and posterior tibial pulses bilaterally.    GAIT:  Normal            Hip Exam:  Right    RANGE OF MOTION:  EXTENSION/FLEXION:  normal (0-110 degrees)  IR:  20  ER:  40  PAIN WITH HIP MOTION:  no  PAIN WITH LOGROLL:  no     STRENGTH:  ABDUCTOR:  5/5  ADDUCTOR:  5/5  HIP FLEXION:  5/5    GREATER TROCHANTER BURSAL PAIN:  no    SENSATION TO LIGHT TOUCH:  DEEP PERONEAL/SUPERFICIAL PERONEAL/SURAL/SAPHENOUS/TIBIAL:   intact    EDEMA:  no  ERYTHEMA:  no  WOUNDS/INCISIONS:   yes, prior stitch abscess area is healed with small as sharp.  There is a small area just proximal to this with exposed stitch and irritation.  No significant purulence can be expressed.  _______________________________________________________________  _______________________________________________________________    RADIOGRAPHIC FINDINGS:   Indication: Status post bilateral total hip arthroplasty    Comparison: Todays xrays were compared to previous xrays from 3/22/2022    AP pelvis: Right: Demonstrate a well positioned total hip without evidence of wear, loosening, fracture or osteolysis, femoral head is concentrically reduced within the acetabulum and No significant changes compared to prior radiographs.;Left: Demonstrate a well positioned total hip without evidence of wear, loosening, fracture or osteolysis, femoral head is concentrically reduced within the acetabulum and No significant changes compared to prior radiographs.        Assessment/Plan:   Diagnosis Plan   1. Status post right hip replacement     2. Surgery follow-up     3.  Postoperative stitch abscess       Patient has an additional stitch abscess.  The stitch involved was removed today.  I placed mupirocin ointment and Band-Aid over the area.  I will see him back in 1 week for repeat wound assessment.  Given how well he is doing clinically as well as his absence of significant clinical exam findings, I do not believe this is a deep infection.  I simply believe this is a stitch abscess.  He is to continue activity as tolerated as well as continued strengthening.  I will see him back next week for wound assessment.  No x-ray needed on return.    Omi Osorio MD  04/12/22  10:52 EDT

## 2022-04-19 ENCOUNTER — OFFICE VISIT (OUTPATIENT)
Dept: ORTHOPEDIC SURGERY | Facility: CLINIC | Age: 72
End: 2022-04-19

## 2022-04-19 VITALS
BODY MASS INDEX: 30.76 KG/M2 | DIASTOLIC BLOOD PRESSURE: 70 MMHG | HEIGHT: 72 IN | SYSTOLIC BLOOD PRESSURE: 132 MMHG | WEIGHT: 227.07 LBS

## 2022-04-19 DIAGNOSIS — Z96.641 STATUS POST RIGHT HIP REPLACEMENT: Primary | ICD-10-CM

## 2022-04-19 DIAGNOSIS — T81.41XA POSTOPERATIVE STITCH ABSCESS: ICD-10-CM

## 2022-04-19 PROCEDURE — 99024 POSTOP FOLLOW-UP VISIT: CPT | Performed by: ORTHOPAEDIC SURGERY

## 2022-04-19 NOTE — PROGRESS NOTES
Orthopaedic Clinic Note:  Hip Post Op    Chief Complaint   Patient presents with   • Follow-up     1 week f/u S/pTotal hip arthroplasty right 2022        HPI    Mr. Eason is 7  week(s) s/p right total hip arthroplasty. Rates pain 2/10. He is ambulating with no assistive device and is taking nothing for pain control. He denies  fever, chills, drainage, swelling or numbness/tingling. He had a couple stitches that were abscessed and removed.  He is here for wound check.  Overall doing better.    Past Medical History:   Diagnosis Date   • Arthritis    • Back pain    • TENA (generalized anxiety disorder)    • GERD (gastroesophageal reflux disease)    • Insomnia    • Migraines    • Panic attacks    • RLS (restless legs syndrome)    • Seasonal allergies    • Wears glasses     reading glasses      Past Surgical History:   Procedure Laterality Date   • ANAL FISTULA REPAIR     • CERVICAL DISCECTOMY ANTERIOR     • COLONOSCOPY     • KNEE CARTILAGE SURGERY Right    • KNEE SURGERY Left    • LUMBAR DISCECTOMY     • SHOULDER ROTATOR CUFF REPAIR Right    • TOTAL HIP ARTHROPLASTY Left 2021    Procedure: TOTAL HIP ARTHROPLASTY LEFT;  Surgeon: Omi Osorio MD;  Location:  [facility] OR;  Service: Orthopedics;  Laterality: Left;   • TOTAL HIP ARTHROPLASTY Right 2022    Procedure: TOTAL HIP ARTHROPLASTY RIGHT;  Surgeon: Omi Osorio MD;  Location:  [facility];  Service: Orthopedics;  Laterality: Right;      Family History   Problem Relation Age of Onset   • Dementia Mother    • Cholecystitis Father      Social History     Socioeconomic History   • Marital status:      Spouse name: Yoon   • Number of children: 0   • Years of education: H.S.   • Highest education level: High school graduate   Tobacco Use   • Smoking status: Former Smoker     Packs/day: 1.00     Years: 10.00     Pack years: 10.00     Types: Cigarettes     Quit date:      Years since quittin.3   • Smokeless  tobacco: Never Used   Vaping Use   • Vaping Use: Never used   Substance and Sexual Activity   • Alcohol use: Not Currently     Comment: QUIT 2+ YRS AGO   • Drug use: No   • Sexual activity: Defer     Partners: Female      Current Outpatient Medications on File Prior to Visit   Medication Sig Dispense Refill   • acetaminophen (TYLENOL) 500 MG tablet Take 2 tablets by mouth Every 8 (Eight) Hours. For 1 week, then as needed 42 tablet 0   • ALPRAZolam (XANAX) 1 MG tablet Take 1-2 mg by mouth At Night As Needed for Anxiety or Sleep.     • aspirin (ASPIR) 81 MG EC tablet Take 1 tablet by mouth 2 (Two) Times a Day. 60 tablet 0   • cephalexin (KEFLEX) 500 MG capsule Take 1 capsule by mouth Every 6 (Six) Hours. 40 capsule 0   • diphenhydrAMINE HCl (ZzzQuil) 50 MG/30ML liquid Take  by mouth At Night As Needed (SLEEP).     • esomeprazole (nexIUM) 40 MG capsule Take 40 mg by mouth Every Morning Before Breakfast.     • ibuprofen (ADVIL,MOTRIN) 800 MG tablet Take 800 mg by mouth As Needed.     • meloxicam (MOBIC) 15 MG tablet Take 1 tablet by mouth Daily. 30 tablet 2   • ondansetron (Zofran) 4 MG tablet Take 1 tablet by mouth Every 8 (Eight) Hours As Needed for Nausea or Vomiting for up to 20 doses. 20 tablet 0   • oxyCODONE (ROXICODONE) 5 MG immediate release tablet Take 1 tablet by mouth Every 4 (Four) Hours As Needed for Moderate Pain . 40 tablet 0   • pramipexole (MIRAPEX) 1.5 MG tablet Take 1.5 mg by mouth 3 (Three) Times a Day.       No current facility-administered medications on file prior to visit.      No Known Allergies     Review of Systems   Constitutional: Negative.    HENT: Negative.    Eyes: Negative.    Respiratory: Negative.    Cardiovascular: Negative.    Gastrointestinal: Negative.    Endocrine: Negative.    Genitourinary: Negative.    Musculoskeletal: Positive for arthralgias.   Skin: Negative.    Allergic/Immunologic: Negative.    Neurological: Negative.    Hematological: Negative.    Psychiatric/Behavioral:  "Negative.         Physical Exam  Blood pressure 132/70, height 182.9 cm (72\"), weight 103 kg (227 lb 1.2 oz).    Body mass index is 30.8 kg/m².    GENERAL APPEARANCE: awake, alert, oriented, in no acute distress and well developed, well nourished  LUNGS:  breathing nonlabored  EXTREMITIES: no clubbing, cyanosis  PERIPHERAL PULSES: palpable dorsalis pedis and posterior tibial pulses bilaterally.    GAIT:  Normal            Hip Exam:  Right     RANGE OF MOTION:  EXTENSION/FLEXION:  normal (0-110 degrees)  IR:  20  ER:  40  PAIN WITH HIP MOTION:  no  PAIN WITH LOGROLL:  no      STRENGTH:  ABDUCTOR:    5/5  ADDUCTOR:  5/5  HIP FLEXION:  5/5     GREATER TROCHANTER BURSAL PAIN:  no    SENSATION TO LIGHT TOUCH:  DEEP PERONEAL/SUPERFICIAL PERONEAL/SURAL/SAPHENOUS/TIBIAL:   intact    EDEMA:  no  ERYTHEMA:  no  WOUNDS/INCISIONS:   yes,  posterior lateral incision is well-healed with no further erythema, stitch abscesses have resolved.  _______________________________________________________________  _______________________________________________________________    RADIOGRAPHIC FINDINGS:   No new imaging today    Assessment/Plan:   Diagnosis Plan   1. Status post right hip replacement     2. Postoperative stitch abscess       Incision is now well-healed with no further stitch abscesses present.  I see no clinical radiographic evidence of deep infection.  Resume activity as tolerated.  Follow-up in 2 months for repeat assessment x-ray AP pelvis.    Omi Osorio MD  04/19/22  11:15 EDT    "

## 2022-06-16 ENCOUNTER — OFFICE VISIT (OUTPATIENT)
Dept: ORTHOPEDIC SURGERY | Facility: CLINIC | Age: 72
End: 2022-06-16

## 2022-06-16 VITALS
DIASTOLIC BLOOD PRESSURE: 78 MMHG | BODY MASS INDEX: 30.75 KG/M2 | HEIGHT: 72 IN | WEIGHT: 227 LBS | SYSTOLIC BLOOD PRESSURE: 140 MMHG

## 2022-06-16 DIAGNOSIS — Z96.642 STATUS POST LEFT HIP REPLACEMENT: ICD-10-CM

## 2022-06-16 DIAGNOSIS — Z96.641 STATUS POST RIGHT HIP REPLACEMENT: Primary | ICD-10-CM

## 2022-06-16 PROCEDURE — 99213 OFFICE O/P EST LOW 20 MIN: CPT | Performed by: ORTHOPAEDIC SURGERY

## 2022-06-16 RX ORDER — FUROSEMIDE 20 MG/1
TABLET ORAL
COMMUNITY
Start: 2022-05-22

## 2022-06-16 NOTE — PROGRESS NOTES
Orthopaedic Clinic Note: Hip Established Patient    Chief Complaint   Patient presents with   • Follow-up     2 month recheck - S/pTotal hip arthroplasty right 2/28/2022   6-month status post left total hip arthroplasty    HPI    It has been 2  month(s) since Mr. Eason's last visit. He returns to clinic today for follow-up bilateral total hip arthroplasty.  He is 3 and half months out from surgery on the right hip and 6 months status post left hip replacement.  Rates his pain a 1/10 on the pain scale.  He is ambulating with no assistive device.  Denies fevers chills or constitutional symptoms.  Overall he is happy with his progress.    Past Medical History:   Diagnosis Date   • Arthritis    • Back pain    • TENA (generalized anxiety disorder)    • GERD (gastroesophageal reflux disease)    • Insomnia    • Migraines    • Panic attacks    • RLS (restless legs syndrome)    • Seasonal allergies    • Wears glasses     reading glasses      Past Surgical History:   Procedure Laterality Date   • ANAL FISTULA REPAIR  2009   • CERVICAL DISCECTOMY ANTERIOR  2000   • COLONOSCOPY  2009   • KNEE CARTILAGE SURGERY Right 1977   • KNEE SURGERY Left 2000   • LUMBAR DISCECTOMY  2011   • SHOULDER ROTATOR CUFF REPAIR Right 2019   • TOTAL HIP ARTHROPLASTY Left 12/1/2021    Procedure: TOTAL HIP ARTHROPLASTY LEFT;  Surgeon: Omi Osorio MD;  Location: [facility] OR;  Service: Orthopedics;  Laterality: Left;   • TOTAL HIP ARTHROPLASTY Right 2/28/2022    Procedure: TOTAL HIP ARTHROPLASTY RIGHT;  Surgeon: Omi Osorio MD;  Location: [facility] OR;  Service: Orthopedics;  Laterality: Right;      Family History   Problem Relation Age of Onset   • Dementia Mother    • Cholecystitis Father      Social History     Socioeconomic History   • Marital status:      Spouse name: Yoon   • Number of children: 0   • Years of education: H.S.   • Highest education level: High school graduate   Tobacco Use   • Smoking status: Former Smoker      Packs/day: 1.00     Years: 10.00     Pack years: 10.00     Types: Cigarettes     Quit date:      Years since quittin.4   • Smokeless tobacco: Never Used   Vaping Use   • Vaping Use: Never used   Substance and Sexual Activity   • Alcohol use: Not Currently     Comment: QUIT 2+ YRS AGO   • Drug use: No   • Sexual activity: Defer     Partners: Female      Current Outpatient Medications on File Prior to Visit   Medication Sig Dispense Refill   • acetaminophen (TYLENOL) 500 MG tablet Take 2 tablets by mouth Every 8 (Eight) Hours. For 1 week, then as needed 42 tablet 0   • ALPRAZolam (XANAX) 1 MG tablet Take 1-2 mg by mouth At Night As Needed for Anxiety or Sleep.     • aspirin (ASPIR) 81 MG EC tablet Take 1 tablet by mouth 2 (Two) Times a Day. 60 tablet 0   • cephalexin (KEFLEX) 500 MG capsule Take 1 capsule by mouth Every 6 (Six) Hours. 40 capsule 0   • diphenhydrAMINE HCl (ZzzQuil) 50 MG/30ML liquid Take  by mouth At Night As Needed (SLEEP).     • esomeprazole (nexIUM) 40 MG capsule Take 40 mg by mouth Every Morning Before Breakfast.     • furosemide (LASIX) 20 MG tablet      • ibuprofen (ADVIL,MOTRIN) 800 MG tablet Take 800 mg by mouth As Needed.     • meloxicam (MOBIC) 15 MG tablet Take 1 tablet by mouth Daily. 30 tablet 2   • ondansetron (Zofran) 4 MG tablet Take 1 tablet by mouth Every 8 (Eight) Hours As Needed for Nausea or Vomiting for up to 20 doses. 20 tablet 0   • oxyCODONE (ROXICODONE) 5 MG immediate release tablet Take 1 tablet by mouth Every 4 (Four) Hours As Needed for Moderate Pain . 40 tablet 0   • pramipexole (MIRAPEX) 1.5 MG tablet Take 1.5 mg by mouth 3 (Three) Times a Day.       No current facility-administered medications on file prior to visit.      No Known Allergies     Review of Systems   Constitutional: Negative.    HENT: Negative.    Eyes: Negative.    Respiratory: Negative.    Cardiovascular: Negative.    Gastrointestinal: Negative.    Endocrine: Negative.    Genitourinary: Negative.  "   Musculoskeletal: Positive for arthralgias.   Skin: Negative.    Allergic/Immunologic: Negative.    Neurological: Negative.    Hematological: Negative.    Psychiatric/Behavioral: Negative.         The patient's Review of Systems was personally reviewed and confirmed as accurate.    Physical Exam  Blood pressure 140/78, height 182.9 cm (72.01\"), weight 103 kg (227 lb).    Body mass index is 30.78 kg/m².    GENERAL APPEARANCE: awake, alert, oriented, in no acute distress and well developed, well nourished  LUNGS:  breathing nonlabored  EXTREMITIES: no clubbing, cyanosis  PERIPHERAL PULSES: palpable dorsalis pedis and posterior tibial pulses bilaterally.    GAIT:  Normal            Hip Exam:  Bilateral    RANGE OF MOTION:  EXTENSION/FLEXION:  normal (0-110 degrees)  IR (at 90 degrees of flexion):  20  ER (at 90 degrees of flexion):  35  PAIN WITH HIP MOTION:  no  PAIN WITH LOGROLL:  no     STINCHFIELD TEST: negative    STRENGTH:  ABDUCTOR:  5/5  ADDUCTOR:  5/5  HIP FLEXION:  5/5    GREATER TROCHANTER BURSAL PAIN:  no    SENSATION TO LIGHT TOUCH:  DEEP PERONEAL/SUPERFICIAL PERONEAL/SURAL/SAPHENOUS/TIBIAL:   intact    EDEMA:  no  ERYTHEMA:  no  WOUNDS/INCISIONS:   yes, well healed surgical incision without evidence of erythema or drainage  _________________________________________________________________  _________________________________________________________________    RADIOGRAPHIC FINDINGS:   Indication: Status post bilateral total hip arthroplasty    Comparison: Todays xrays were compared to previous xrays from 4/12/2022    AP pelvis: Right: Demonstrate a well positioned total hip without evidence of wear, loosening, fracture or osteolysis, femoral head is concentrically reduced within the acetabulum and No significant changes compared to prior radiographs.;Left: Demonstrate a well positioned total hip without evidence of wear, loosening, fracture or osteolysis, femoral head is concentrically reduced within the " acetabulum and No significant changes compared to prior radiographs.      Assessment/Plan:   Diagnosis Plan   1. Status post right hip replacement  XR Pelvis 1 or 2 View   2. Status post left hip replacement       Patient is doing well 3 months status post right total hip arthroplasty and 6-month status post left hip arthroplasty.  I recommend continued activity as tolerated without restrictions in regards to both hips.  I will see the patient back in 9 months for repeat assessment of both hips with x-ray view pelvis on return.  Patient is welcome to follow-up sooner should problems arise.    Omi Osorio MD  06/16/22  11:12 EDT

## 2023-08-07 ENCOUNTER — HOSPITAL ENCOUNTER (EMERGENCY)
Facility: HOSPITAL | Age: 73
Discharge: HOME | End: 2023-08-07
Attending: EMERGENCY MEDICINE | Admitting: EMERGENCY MEDICINE
Payer: MEDICARE

## 2023-08-07 ENCOUNTER — APPOINTMENT (OUTPATIENT)
Dept: CT IMAGING | Facility: HOSPITAL | Age: 73
End: 2023-08-07
Payer: MEDICARE

## 2023-08-07 VITALS
SYSTOLIC BLOOD PRESSURE: 162 MMHG | DIASTOLIC BLOOD PRESSURE: 76 MMHG | HEIGHT: 72 IN | RESPIRATION RATE: 21 BRPM | WEIGHT: 218 LBS | TEMPERATURE: 97.8 F | BODY MASS INDEX: 29.53 KG/M2 | OXYGEN SATURATION: 96 % | HEART RATE: 92 BPM

## 2023-08-07 DIAGNOSIS — K59.00 CONSTIPATION, UNSPECIFIED CONSTIPATION TYPE: Primary | ICD-10-CM

## 2023-08-07 DIAGNOSIS — R10.84 GENERALIZED ABDOMINAL PAIN: ICD-10-CM

## 2023-08-07 LAB
ALBUMIN SERPL-MCNC: 4.2 G/DL (ref 3.5–5.2)
ALBUMIN/GLOB SERPL: 1.3 G/DL
ALP SERPL-CCNC: 113 U/L (ref 39–117)
ALT SERPL W P-5'-P-CCNC: 27 U/L (ref 1–41)
ANION GAP SERPL CALCULATED.3IONS-SCNC: 10 MMOL/L (ref 5–15)
AST SERPL-CCNC: 34 U/L (ref 1–40)
BASOPHILS # BLD AUTO: 0.02 10*3/MM3 (ref 0–0.2)
BASOPHILS NFR BLD AUTO: 0.2 % (ref 0–1.5)
BILIRUB SERPL-MCNC: 0.3 MG/DL (ref 0–1.2)
BILIRUB UR QL STRIP: NEGATIVE
BUN SERPL-MCNC: 13 MG/DL (ref 8–23)
BUN/CREAT SERPL: 20.3 (ref 7–25)
CALCIUM SPEC-SCNC: 8.9 MG/DL (ref 8.6–10.5)
CHLORIDE SERPL-SCNC: 102 MMOL/L (ref 98–107)
CLARITY UR: CLEAR
CO2 SERPL-SCNC: 25 MMOL/L (ref 22–29)
COLOR UR: YELLOW
CREAT BLDA-MCNC: 1.3 MG/DL
CREAT BLDA-MCNC: 1.3 MG/DL (ref 0.6–1.3)
CREAT SERPL-MCNC: 0.64 MG/DL (ref 0.76–1.27)
D-LACTATE SERPL-SCNC: 1.8 MMOL/L (ref 0.5–2)
D-LACTATE SERPL-SCNC: 2.2 MMOL/L (ref 0.5–2)
DEPRECATED RDW RBC AUTO: 44.2 FL (ref 37–54)
EGFRCR SERPLBLD CKD-EPI 2021: 101.2 ML/MIN/1.73
EOSINOPHIL # BLD AUTO: 0.01 10*3/MM3 (ref 0–0.4)
EOSINOPHIL NFR BLD AUTO: 0.1 % (ref 0.3–6.2)
ERYTHROCYTE [DISTWIDTH] IN BLOOD BY AUTOMATED COUNT: 14.6 % (ref 12.3–15.4)
GLOBULIN UR ELPH-MCNC: 3.2 GM/DL
GLUCOSE SERPL-MCNC: 104 MG/DL (ref 65–99)
GLUCOSE UR STRIP-MCNC: NEGATIVE MG/DL
HCT VFR BLD AUTO: 45.8 % (ref 37.5–51)
HGB BLD-MCNC: 15 G/DL (ref 13–17.7)
HGB UR QL STRIP.AUTO: NEGATIVE
HOLD SPECIMEN: NORMAL
IMM GRANULOCYTES # BLD AUTO: 0.03 10*3/MM3 (ref 0–0.05)
IMM GRANULOCYTES NFR BLD AUTO: 0.3 % (ref 0–0.5)
KETONES UR QL STRIP: NEGATIVE
LEUKOCYTE ESTERASE UR QL STRIP.AUTO: NEGATIVE
LIPASE SERPL-CCNC: 42 U/L (ref 13–60)
LYMPHOCYTES # BLD AUTO: 1.43 10*3/MM3 (ref 0.7–3.1)
LYMPHOCYTES NFR BLD AUTO: 12 % (ref 19.6–45.3)
MCH RBC QN AUTO: 27.9 PG (ref 26.6–33)
MCHC RBC AUTO-ENTMCNC: 32.8 G/DL (ref 31.5–35.7)
MCV RBC AUTO: 85.3 FL (ref 79–97)
MONOCYTES # BLD AUTO: 0.71 10*3/MM3 (ref 0.1–0.9)
MONOCYTES NFR BLD AUTO: 5.9 % (ref 5–12)
NEUTROPHILS NFR BLD AUTO: 81.5 % (ref 42.7–76)
NEUTROPHILS NFR BLD AUTO: 9.76 10*3/MM3 (ref 1.7–7)
NITRITE UR QL STRIP: NEGATIVE
NRBC BLD AUTO-RTO: 0 /100 WBC (ref 0–0.2)
PH UR STRIP.AUTO: 8 [PH] (ref 5–8)
PLATELET # BLD AUTO: 237 10*3/MM3 (ref 140–450)
PMV BLD AUTO: 10.3 FL (ref 6–12)
POTASSIUM SERPL-SCNC: 4.3 MMOL/L (ref 3.5–5.2)
PROT SERPL-MCNC: 7.4 G/DL (ref 6–8.5)
PROT UR QL STRIP: NEGATIVE
RBC # BLD AUTO: 5.37 10*6/MM3 (ref 4.14–5.8)
SODIUM SERPL-SCNC: 137 MMOL/L (ref 136–145)
SP GR UR STRIP: 1.02 (ref 1–1.03)
UROBILINOGEN UR QL STRIP: NORMAL
WBC NRBC COR # BLD: 11.96 10*3/MM3 (ref 3.4–10.8)
WHOLE BLOOD HOLD COAG: NORMAL
WHOLE BLOOD HOLD SPECIMEN: NORMAL

## 2023-08-07 PROCEDURE — 99285 EMERGENCY DEPT VISIT HI MDM: CPT

## 2023-08-07 PROCEDURE — 25010000002 MORPHINE PER 10 MG: Performed by: EMERGENCY MEDICINE

## 2023-08-07 PROCEDURE — 36415 COLL VENOUS BLD VENIPUNCTURE: CPT

## 2023-08-07 PROCEDURE — 74177 CT ABD & PELVIS W/CONTRAST: CPT

## 2023-08-07 PROCEDURE — 25010000002 ONDANSETRON PER 1 MG: Performed by: EMERGENCY MEDICINE

## 2023-08-07 PROCEDURE — 80053 COMPREHEN METABOLIC PANEL: CPT | Performed by: EMERGENCY MEDICINE

## 2023-08-07 PROCEDURE — 83605 ASSAY OF LACTIC ACID: CPT | Performed by: EMERGENCY MEDICINE

## 2023-08-07 PROCEDURE — 83690 ASSAY OF LIPASE: CPT | Performed by: EMERGENCY MEDICINE

## 2023-08-07 PROCEDURE — 85025 COMPLETE CBC W/AUTO DIFF WBC: CPT | Performed by: EMERGENCY MEDICINE

## 2023-08-07 PROCEDURE — 82565 ASSAY OF CREATININE: CPT

## 2023-08-07 PROCEDURE — 81003 URINALYSIS AUTO W/O SCOPE: CPT | Performed by: EMERGENCY MEDICINE

## 2023-08-07 PROCEDURE — 25510000001 IOPAMIDOL 61 % SOLUTION: Performed by: EMERGENCY MEDICINE

## 2023-08-07 PROCEDURE — 96374 THER/PROPH/DIAG INJ IV PUSH: CPT

## 2023-08-07 PROCEDURE — 96375 TX/PRO/DX INJ NEW DRUG ADDON: CPT

## 2023-08-07 RX ORDER — ONDANSETRON 2 MG/ML
4 INJECTION INTRAMUSCULAR; INTRAVENOUS ONCE
Status: COMPLETED | OUTPATIENT
Start: 2023-08-07 | End: 2023-08-07

## 2023-08-07 RX ORDER — SODIUM CHLORIDE 9 MG/ML
10 INJECTION INTRAVENOUS AS NEEDED
Status: DISCONTINUED | OUTPATIENT
Start: 2023-08-07 | End: 2023-08-07 | Stop reason: HOSPADM

## 2023-08-07 RX ORDER — MORPHINE SULFATE 4 MG/ML
4 INJECTION, SOLUTION INTRAMUSCULAR; INTRAVENOUS ONCE
Status: COMPLETED | OUTPATIENT
Start: 2023-08-07 | End: 2023-08-07

## 2023-08-07 RX ADMIN — ONDANSETRON 4 MG: 2 INJECTION INTRAMUSCULAR; INTRAVENOUS at 17:32

## 2023-08-07 RX ADMIN — IOPAMIDOL 100 ML: 612 INJECTION, SOLUTION INTRAVENOUS at 16:09

## 2023-08-07 RX ADMIN — SODIUM CHLORIDE 1000 ML: 9 INJECTION, SOLUTION INTRAVENOUS at 17:32

## 2023-08-07 RX ADMIN — MORPHINE SULFATE 4 MG: 4 INJECTION, SOLUTION INTRAMUSCULAR; INTRAVENOUS at 17:33

## 2023-08-07 NOTE — ED PROVIDER NOTES
Subjective   History of Present Illness  Pt is a 73 yo male presenting to ED with complaints of abdominal pain. PMHx significant for Anxiety, Migraines, GERD, CBP and Rectal fistula repair 2010. Pt with generalized abdominal pain that began today. He reports constipation and LBM Saturday. He has tried fleet enema, glycerin suppositories and stool softeners. He reports rectal pressure but no obvious hemorrhoid. He denies rectal bleeding. He denies N/V, fever, CP, SOB or cough. He denies prior abdominal surgical hx. He does not take chronic narcotics. He denies tobacco, drug or ETOH use.     History provided by:  Patient, medical records and spouse    Review of Systems   Constitutional:  Negative for chills and fever.   HENT:  Negative for congestion, sore throat and trouble swallowing.    Eyes:  Negative for pain and visual disturbance.   Respiratory:  Negative for cough and shortness of breath.    Cardiovascular:  Negative for chest pain and leg swelling.   Gastrointestinal:  Positive for abdominal pain, constipation and rectal pain. Negative for anal bleeding, blood in stool, diarrhea, nausea and vomiting.   Genitourinary:  Negative for difficulty urinating, dysuria and flank pain.   Musculoskeletal:  Negative for arthralgias, back pain and joint swelling.   Skin: Negative.  Negative for rash and wound.   Allergic/Immunologic: Negative.    Neurological:  Negative for dizziness, syncope, weakness, numbness and headaches.   Psychiatric/Behavioral:  Negative for confusion.    All other systems reviewed and are negative.    Past Medical History:   Diagnosis Date    Arthritis     Back pain     TENA (generalized anxiety disorder)     GERD (gastroesophageal reflux disease)     Insomnia     Migraines     Panic attacks     RLS (restless legs syndrome)     Seasonal allergies     Wears glasses     reading glasses       No Known Allergies    Past Surgical History:   Procedure Laterality Date    ANAL FISTULA REPAIR  2009     CERVICAL DISCECTOMY ANTERIOR  2000    COLONOSCOPY  2009    KNEE CARTILAGE SURGERY Right 1977    KNEE SURGERY Left 2000    LUMBAR DISCECTOMY  2011    SHOULDER ROTATOR CUFF REPAIR Right 2019    TOTAL HIP ARTHROPLASTY Left 2021    Procedure: TOTAL HIP ARTHROPLASTY LEFT;  Surgeon: Omi Osorio MD;  Location:  [facility] OR;  Service: Orthopedics;  Laterality: Left;    TOTAL HIP ARTHROPLASTY Right 2022    Procedure: TOTAL HIP ARTHROPLASTY RIGHT;  Surgeon: Omi Osorio MD;  Location:  [facility] OR;  Service: Orthopedics;  Laterality: Right;       Family History   Problem Relation Age of Onset    Dementia Mother     Cholecystitis Father        Social History     Socioeconomic History    Marital status:      Spouse name: Yoon    Number of children: 0    Years of education: H.S.    Highest education level: High school graduate   Tobacco Use    Smoking status: Former     Packs/day: 1.00     Years: 10.00     Pack years: 10.00     Types: Cigarettes     Quit date:      Years since quittin.6    Smokeless tobacco: Never   Vaping Use    Vaping Use: Never used   Substance and Sexual Activity    Alcohol use: Not Currently     Comment: QUIT 2+ YRS AGO    Drug use: No    Sexual activity: Defer     Partners: Female           Objective   Physical Exam  Vitals and nursing note reviewed. Exam conducted with a chaperone present.   Constitutional:       Appearance: He is well-developed.   HENT:      Head: Atraumatic.      Nose: Nose normal.   Eyes:      General: Lids are normal.      Conjunctiva/sclera: Conjunctivae normal.      Pupils: Pupils are equal, round, and reactive to light.   Cardiovascular:      Rate and Rhythm: Normal rate and regular rhythm.      Heart sounds: Normal heart sounds.   Pulmonary:      Effort: Pulmonary effort is normal.      Breath sounds: Normal breath sounds.   Abdominal:      General: There is no distension.      Palpations: Abdomen is soft.      Tenderness: There is generalized no  abdominal tenderness. There is no guarding or rebound.   Genitourinary:     Rectum: Tenderness present. No external hemorrhoid.   Musculoskeletal:         General: No tenderness or deformity. Normal range of motion.      Cervical back: Normal range of motion and neck supple.   Skin:     General: Skin is warm and dry.   Neurological:      Mental Status: He is alert and oriented to person, place, and time.      Sensory: No sensory deficit.   Psychiatric:         Behavior: Behavior normal.       Procedures           ED Course  ED Course as of 08/07/23 2245   Mon Aug 07, 2023   1644 Discussed results with patient and family. Will give patient enema in ED and then plan for discharge and close f/u outpatient with PCP and GI.  [RT]      ED Course User Index  [RT] Fiordaliza Smith, PA           Recent Results (from the past 24 hour(s))   Urinalysis With Microscopic If Indicated (No Culture) - Urine, Clean Catch    Collection Time: 08/07/23  3:46 PM    Specimen: Urine, Clean Catch   Result Value Ref Range    Color, UA Yellow Yellow, Straw    Appearance, UA Clear Clear    pH, UA 8.0 5.0 - 8.0    Specific Gravity, UA 1.017 1.001 - 1.030    Glucose, UA Negative Negative    Ketones, UA Negative Negative    Bilirubin, UA Negative Negative    Blood, UA Negative Negative    Protein, UA Negative Negative    Leuk Esterase, UA Negative Negative    Nitrite, UA Negative Negative    Urobilinogen, UA 1.0 E.U./dL 0.2 - 1.0 E.U./dL   Comprehensive Metabolic Panel    Collection Time: 08/07/23  3:47 PM    Specimen: Blood   Result Value Ref Range    Glucose 104 (H) 65 - 99 mg/dL    BUN 13 8 - 23 mg/dL    Creatinine 0.64 (L) 0.76 - 1.27 mg/dL    Sodium 137 136 - 145 mmol/L    Potassium 4.3 3.5 - 5.2 mmol/L    Chloride 102 98 - 107 mmol/L    CO2 25.0 22.0 - 29.0 mmol/L    Calcium 8.9 8.6 - 10.5 mg/dL    Total Protein 7.4 6.0 - 8.5 g/dL    Albumin 4.2 3.5 - 5.2 g/dL    ALT (SGPT) 27 1 - 41 U/L    AST (SGOT) 34 1 - 40 U/L    Alkaline Phosphatase  113 39 - 117 U/L    Total Bilirubin 0.3 0.0 - 1.2 mg/dL    Globulin 3.2 gm/dL    A/G Ratio 1.3 g/dL    BUN/Creatinine Ratio 20.3 7.0 - 25.0    Anion Gap 10.0 5.0 - 15.0 mmol/L    eGFR 101.2 >60.0 mL/min/1.73   Lipase    Collection Time: 08/07/23  3:47 PM    Specimen: Blood   Result Value Ref Range    Lipase 42 13 - 60 U/L   Lactic Acid, Plasma    Collection Time: 08/07/23  3:47 PM    Specimen: Blood   Result Value Ref Range    Lactate 2.2 (C) 0.5 - 2.0 mmol/L   Green Top (Gel)    Collection Time: 08/07/23  3:47 PM   Result Value Ref Range    Extra Tube Hold for add-ons.    Lavender Top    Collection Time: 08/07/23  3:47 PM   Result Value Ref Range    Extra Tube hold for add-on    Gold Top - SST    Collection Time: 08/07/23  3:47 PM   Result Value Ref Range    Extra Tube Hold for add-ons.    Gray Top    Collection Time: 08/07/23  3:47 PM   Result Value Ref Range    Extra Tube Hold for add-ons.    Light Blue Top    Collection Time: 08/07/23  3:47 PM   Result Value Ref Range    Extra Tube Hold for add-ons.    CBC Auto Differential    Collection Time: 08/07/23  3:47 PM    Specimen: Blood   Result Value Ref Range    WBC 11.96 (H) 3.40 - 10.80 10*3/mm3    RBC 5.37 4.14 - 5.80 10*6/mm3    Hemoglobin 15.0 13.0 - 17.7 g/dL    Hematocrit 45.8 37.5 - 51.0 %    MCV 85.3 79.0 - 97.0 fL    MCH 27.9 26.6 - 33.0 pg    MCHC 32.8 31.5 - 35.7 g/dL    RDW 14.6 12.3 - 15.4 %    RDW-SD 44.2 37.0 - 54.0 fl    MPV 10.3 6.0 - 12.0 fL    Platelets 237 140 - 450 10*3/mm3    Neutrophil % 81.5 (H) 42.7 - 76.0 %    Lymphocyte % 12.0 (L) 19.6 - 45.3 %    Monocyte % 5.9 5.0 - 12.0 %    Eosinophil % 0.1 (L) 0.3 - 6.2 %    Basophil % 0.2 0.0 - 1.5 %    Immature Grans % 0.3 0.0 - 0.5 %    Neutrophils, Absolute 9.76 (H) 1.70 - 7.00 10*3/mm3    Lymphocytes, Absolute 1.43 0.70 - 3.10 10*3/mm3    Monocytes, Absolute 0.71 0.10 - 0.90 10*3/mm3    Eosinophils, Absolute 0.01 0.00 - 0.40 10*3/mm3    Basophils, Absolute 0.02 0.00 - 0.20 10*3/mm3    Immature  "Grans, Absolute 0.03 0.00 - 0.05 10*3/mm3    nRBC 0.0 0.0 - 0.2 /100 WBC   POC Creatinine    Collection Time: 08/07/23  3:52 PM    Specimen: Blood   Result Value Ref Range    Creatinine 1.30 0.60 - 1.30 mg/dL   POC Creatinine    Collection Time: 08/07/23  3:53 PM    Specimen: Blood   Result Value Ref Range    Creatinine 1.30 mg/dL   STAT Lactic Acid, Reflex    Collection Time: 08/07/23  7:28 PM    Specimen: Blood   Result Value Ref Range    Lactate 1.8 0.5 - 2.0 mmol/L     Note: In addition to lab results from this visit, the labs listed above may include labs taken at another facility or during a different encounter within the last 24 hours. Please correlate lab times with ED admission and discharge times for further clarification of the services performed during this visit.    CT Abdomen Pelvis With Contrast   Final Result   Impression:   No acute process identified. Incidental nonemergent findings as detailed above.            Electronically Signed: Ralph Chavez MD     8/7/2023 3:17 PM CDT     Workstation ID: [id number]        Vitals:    08/07/23 1424   BP: 162/76   BP Location: Left arm   Patient Position: Sitting   Pulse: 92   Resp: 21   Temp: 97.8 øF (36.6 øC)   TempSrc: Oral   SpO2: 96%   Weight: 98.9 kg (218 lb)   Height: 182.9 cm (72\")     Medications   sodium chloride 0.9 % bolus 1,000 mL (0 mL Intravenous Stopped 8/7/23 1916)   ondansetron (ZOFRAN) injection 4 mg (4 mg Intravenous Given 8/7/23 1732)   Morphine sulfate (PF) injection 4 mg (4 mg Intravenous Given 8/7/23 1733)   iopamidol (ISOVUE-300) 61 % injection 100 mL (100 mL Intravenous Given 8/7/23 1609)     ECG/EMG Results (last 24 hours)       ** No results found for the last 24 hours. **          No orders to display       DISCHARGE    Patient discharged in stable condition.    Reviewed implications of results, diagnosis, meds, responsibility to follow up, warning signs and symptoms of possible worsening, potential complications and reasons to " return to ER.    Patient/Family voiced understanding of above instructions.    Discussed plan for discharge, as there is no emergent indication for admission.  Pt/family is agreeable and understands need for follow up and possible repeat testing.  Pt/family is aware that discharge does not mean that nothing is wrong but that it indicates no emergency is currently present that requires admission and they must continue care with follow-up as given below or with a physician of their choice.     FOLLOW-UP  Jeffery Hester MD  [address]  [place] [id number] [phone]    Schedule an appointment as soon as possible for a visit       Naveen Amin MD  [address]  [place] [id number] [phone]    Schedule an appointment as soon as possible for a visit       [facility] EMERGENCY DEPARTMENT  [address] [facility] [address] [phone]    If symptoms worsen         Medication List      No changes were made to your prescriptions during this visit.                                         Medical Decision Making  Pt is a 73 yo male presenting to ED with complaints of abdominal pain and constipation. CT abd/pelvis without acute emergent findings and notable moderate rectal fecal load. Labs without emergent findings and notable WBC 11, Lactic 2.2 with repeat 1.8 and Glucose 104. Patient given multiple enemas in ED with passage of hard stool. Patient feeling better in ED. Discussed continued stool softeners at home and close f/u with PCP and Dr. Amin. They will return to if new / worse sx.     Discussed patient with Dr. Reynolds who is agreeable with ED course and tx plan.     DDx  SBO, Hemorrhoids (internal / external), Colon mass, Fistula, Abscess, Dehydration     Problems Addressed:  Constipation, unspecified constipation type: complicated acute illness or injury  Generalized abdominal pain: complicated acute illness or injury    Amount and/or Complexity of Data  Reviewed  Independent Historian: spouse  External Data Reviewed: notes.  Labs: ordered. Decision-making details documented in ED Course.  Radiology: ordered. Decision-making details documented in ED Course.    Risk  Prescription drug management.        Final diagnoses:   Constipation, unspecified constipation type   Generalized abdominal pain       ED Disposition  ED Disposition       ED Disposition   Discharge    Condition   Stable    Comment   --               Jeffery Hester MD  [address] [place] [id number] [phone]    Schedule an appointment as soon as possible for a visit       Naveen Amin MD  [address]  [place] [id number] [phone]    Schedule an appointment as soon as possible for a visit       [facility] EMERGENCY DEPARTMENT  [address] [facility] [address] [phone]    If symptoms worsen         Medication List      No changes were made to your prescriptions during this visit.            Fiordaliza Smith PA  08/07/23 6031

## 2025-07-28 ENCOUNTER — OFFICE VISIT (OUTPATIENT)
Dept: ORTHOPEDIC SURGERY | Facility: CLINIC | Age: 75
End: 2025-07-28
Payer: MEDICARE

## 2025-07-28 VITALS
DIASTOLIC BLOOD PRESSURE: 72 MMHG | WEIGHT: 224.4 LBS | SYSTOLIC BLOOD PRESSURE: 132 MMHG | BODY MASS INDEX: 31.42 KG/M2 | HEIGHT: 71 IN

## 2025-07-28 DIAGNOSIS — M25.512 LEFT SHOULDER PAIN, UNSPECIFIED CHRONICITY: Primary | ICD-10-CM

## 2025-07-28 DIAGNOSIS — M75.22 BICEPS TENDINITIS OF LEFT UPPER EXTREMITY: ICD-10-CM

## 2025-07-28 PROCEDURE — 1159F MED LIST DOCD IN RCRD: CPT | Performed by: ORTHOPAEDIC SURGERY

## 2025-07-28 PROCEDURE — 1160F RVW MEDS BY RX/DR IN RCRD: CPT | Performed by: ORTHOPAEDIC SURGERY

## 2025-07-28 PROCEDURE — 99204 OFFICE O/P NEW MOD 45 MIN: CPT | Performed by: ORTHOPAEDIC SURGERY

## 2025-07-28 RX ORDER — ROPINIROLE 1 MG/1
TABLET, FILM COATED ORAL
COMMUNITY

## 2025-07-28 RX ORDER — LOSARTAN POTASSIUM AND HYDROCHLOROTHIAZIDE 25; 100 MG/1; MG/1
TABLET ORAL
COMMUNITY
Start: 2025-04-10

## 2025-07-28 RX ORDER — NYSTATIN AND TRIAMCINOLONE ACETONIDE 100000; 1 [USP'U]/G; MG/G
1 OINTMENT TOPICAL 2 TIMES DAILY
COMMUNITY
Start: 2025-02-25 | End: 2026-02-25

## 2025-07-28 RX ORDER — TRAZODONE HYDROCHLORIDE 50 MG/1
50 TABLET ORAL DAILY
COMMUNITY
Start: 2025-07-21

## 2025-07-28 NOTE — PROGRESS NOTES
[facility] Note        Subjective     Pain of the Left Shoulder      HPI    Adonay Eason is a 74 y.o. male.  New patient with left shoulder pain.  Pain and popping while carrying boxes on July 21.  He is retired.  History of previous right shoulder surgery.  Pain with certain activities.  No recent treatment    Past Medical History:   Diagnosis Date    Arthritis     Back pain     Bursitis of hip 2009    never really got over it    Dislocation, shoulder 1969    football in high school    TENA (generalized anxiety disorder)     GERD (gastroesophageal reflux disease)     Hip arthrosis 2010    Insomnia     Migraines     Panic attacks     RLS (restless legs syndrome)     Rotator cuff syndrome 2019    repaired feb 2020    Seasonal allergies     Tennis elbow 2005    tolerate it    Wears glasses     reading glasses      Past Surgical History:   Procedure Laterality Date    ANAL FISTULA REPAIR  2009    BACK SURGERY  sciatic nerve issue    2009    CERVICAL DISCECTOMY ANTERIOR  2000    COLONOSCOPY  2009    KNEE CARTILAGE SURGERY Right 1977    KNEE SURGERY Left 2000    LUMBAR DISCECTOMY  2011    NECK SURGERY  herniated disc    replaced 2000    SHOULDER ROTATOR CUFF REPAIR Right 2019    SHOULDER SURGERY  see above    TOTAL HIP ARTHROPLASTY Left 12/01/2021    Procedure: TOTAL HIP ARTHROPLASTY LEFT;  Surgeon: Omi Osorio MD;  Location: [facility];  Service: Orthopedics;  Laterality: Left;    TOTAL HIP ARTHROPLASTY Right 02/28/2022    Procedure: TOTAL HIP ARTHROPLASTY RIGHT;  Surgeon: Omi Osorio MD;  Location: [facility] OR;  Service: Orthopedics;  Laterality: Right;      Family History   Problem Relation Age of Onset    Dementia Mother     Cholecystitis Father      Social History     Socioeconomic History    Marital status:      Spouse name: Yoon    Number of children: 0    Years of education: H.S.    Highest education level: High school graduate   Tobacco Use    Smoking status:  Former     Current packs/day: 0.00     Average packs/day: 1 pack/day for 10.0 years (10.0 ttl pk-yrs)     Types: Cigarettes     Start date:      Quit date:      Years since quittin.6    Smokeless tobacco: Never   Vaping Use    Vaping status: Never Used   Substance and Sexual Activity    Alcohol use: Not Currently     Comment: QUIT 2+ YRS AGO    Drug use: No    Sexual activity: Defer     Partners: Female      Current Outpatient Medications on File Prior to Visit   Medication Sig Dispense Refill    acetaminophen (TYLENOL) 500 MG tablet Take 2 tablets by mouth Every 8 (Eight) Hours. For 1 week, then as needed 42 tablet 0    esomeprazole (nexIUM) 40 MG capsule Take 1 capsule by mouth Every Morning Before Breakfast.      ibuprofen (ADVIL,MOTRIN) 800 MG tablet Take 1 tablet by mouth As Needed.      losartan-hydrochlorothiazide (HYZAAR) 100-25 MG per tablet       rOPINIRole (REQUIP) 1 MG tablet       traZODone (DESYREL) 50 MG tablet Take 1 tablet by mouth Daily.      ALPRAZolam (XANAX) 1 MG tablet Take 1-2 mg by mouth At Night As Needed for Anxiety or Sleep. (Patient not taking: Reported on 2025)      aspirin (ASPIR) 81 MG EC tablet Take 1 tablet by mouth 2 (Two) Times a Day. (Patient not taking: Reported on 2025) 60 tablet 0    cephalexin (KEFLEX) 500 MG capsule Take 1 capsule by mouth Every 6 (Six) Hours. (Patient not taking: Reported on 2025) 40 capsule 0    diphenhydrAMINE HCl (ZzzQuil) 50 MG/30ML liquid Take  by mouth At Night As Needed (SLEEP). (Patient not taking: Reported on 2025)      furosemide (LASIX) 20 MG tablet  (Patient not taking: Reported on 2025)      meloxicam (MOBIC) 15 MG tablet Take 1 tablet by mouth Daily. (Patient not taking: Reported on 2025) 30 tablet 2    nystatin-triamcinolone (MYCOLOG) 729250-6.1 UNIT/GM-% ointment Apply 1 Application topically to the appropriate area as directed 2 (Two) Times a Day. (Patient not taking: Reported on 2025)       ondansetron (Zofran) 4 MG tablet Take 1 tablet by mouth Every 8 (Eight) Hours As Needed for Nausea or Vomiting for up to 20 doses. (Patient not taking: Reported on 7/28/2025) 20 tablet 0    oxyCODONE (ROXICODONE) 5 MG immediate release tablet Take 1 tablet by mouth Every 4 (Four) Hours As Needed for Moderate Pain . (Patient not taking: Reported on 7/28/2025) 40 tablet 0    pramipexole (MIRAPEX) 1.5 MG tablet Take 1.5 mg by mouth 3 (Three) Times a Day. (Patient not taking: Reported on 7/28/2025)       No current facility-administered medications on file prior to visit.      No Known Allergies       Review of Systems   Constitutional:  Negative for activity change, appetite change, chills, diaphoresis, fatigue, fever and unexpected weight change.   HENT:  Negative for congestion, dental problem, drooling, ear discharge, ear pain, facial swelling, hearing loss, mouth sores, nosebleeds, postnasal drip, rhinorrhea, sinus pressure, sneezing, sore throat, tinnitus, trouble swallowing and voice change.    Eyes:  Negative for photophobia, pain, discharge, redness, itching and visual disturbance.   Respiratory:  Negative for apnea, cough, choking, chest tightness, shortness of breath, wheezing and stridor.    Cardiovascular:  Negative for chest pain, palpitations and leg swelling.   Gastrointestinal:  Negative for abdominal distention, abdominal pain, anal bleeding, blood in stool, constipation, diarrhea, nausea, rectal pain and vomiting.   Endocrine: Negative for cold intolerance, heat intolerance, polydipsia, polyphagia and polyuria.   Genitourinary:  Negative for decreased urine volume, difficulty urinating, dysuria, enuresis, flank pain, frequency, genital sores, hematuria and urgency.   Musculoskeletal:  Positive for arthralgias. Negative for back pain, gait problem, joint swelling, myalgias, neck pain and neck stiffness.   Skin:  Negative for color change, pallor, rash and wound.   Allergic/Immunologic: Negative for  "environmental allergies, food allergies and immunocompromised state.   Neurological:  Negative for dizziness, tremors, seizures, syncope, facial asymmetry, speech difficulty, weakness, light-headedness, numbness and headaches.   Hematological:  Negative for adenopathy. Does not bruise/bleed easily.   Psychiatric/Behavioral:  Negative for agitation, behavioral problems, confusion, decreased concentration, dysphoric mood, hallucinations, self-injury, sleep disturbance and suicidal ideas. The patient is not nervous/anxious and is not hyperactive.    All other systems reviewed and are negative.       I reviewed the patient's chief complaint, history of present illness, review of systems, past medical history, surgical history, family history, social history, medications and allergy list.        Objective      Physical Exam  /72 (BP Location: Left arm, Patient Position: Sitting)   Ht 181.3 cm (71.38\")   Wt 102 kg (224 lb 6.4 oz)   BMI 30.97 kg/m²     Body mass index is 30.97 kg/m².    General  Mental Status - alert  General Appearance - cooperative, well groomed, not in acute distress  Orientation - Oriented X3  Build & Nutrition - well developed and well nourished  Posture - normal posture  Gait - normal gait       Ortho Exam  Left shoulder full motion full-strength tender biceps tendon.  Negative impingement.    Imaging/Studies Reviewed and Interpreted:  Imaging Results (Last 24 Hours)       Procedure Component Value Units Date/Time    XR Shoulder 2+ View Left [[id number]] Resulted: 07/28/25 1310     Updated: 07/28/25 1310    Narrative:      Left Shoulder X-Rays  Indication: Pain  AP, scapular Y, and axillary lateral views    Findings:  No fracture  No bony lesion  Normal soft tissues  Normal joint spaces    No prior studies were available for comparison.              Assessment    Assessment:  1. Left shoulder pain, unspecified chronicity    2. Biceps tendinitis of left upper extremity        Plan:  Continue " over-the-counter medication as needed for discomfort  I have ordered physical therapy.  He will continue anti-inflammatories.  Follow-up in 3 to 4 weeks and if not better we will get an MRI.        Ruperto López MD  07/28/25  13:56 EDT      Dictated Utilizing Dragon Dictation.

## (undated) DEVICE — STERILE PVP: Brand: MEDLINE INDUSTRIES, INC.

## (undated) DEVICE — TB SXN FRAZIER 12F STRL

## (undated) DEVICE — PATIENT RETURN ELECTRODE, SINGLE-USE, CONTACT QUALITY MONITORING, ADULT, WITH 9FT CORD, FOR PATIENTS WEIGING OVER 33LBS. (15KG): Brand: MEGADYNE

## (undated) DEVICE — COATED BRAIDED POLYESTER: Brand: TI-CRON

## (undated) DEVICE — BIT DRL 3.3X25MM DISP

## (undated) DEVICE — PEG PAD FOR SURG DISP

## (undated) DEVICE — PK HIP TOTL UNIV 10

## (undated) DEVICE — GLV SURG SENSICARE PI MIC PF SZ9 LF STRL

## (undated) DEVICE — ELECTRD BLD EZ CLN STD 2.5IN

## (undated) DEVICE — UNDERGLV SURG BIOGEL INDICAT PI SZ8 BLU

## (undated) DEVICE — SYR LUERLOK 30CC

## (undated) DEVICE — TRY EPID SFTY 18G 3.5IN 1T7680

## (undated) DEVICE — ELECTRD BLD EZ CLN STD 6.5IN

## (undated) DEVICE — HEWSON SUTURE RETRIEVER: Brand: HEWSON SUTURE RETRIEVER

## (undated) DEVICE — SUT VIC 1 CTX 36IN OBGYN VCP977H

## (undated) DEVICE — DRAPE,REIN 53X77,STERILE: Brand: MEDLINE

## (undated) DEVICE — TRAP FLD MINIVAC MEGADYNE 100ML

## (undated) DEVICE — PILLW ABD MD

## (undated) DEVICE — TBG PENCL TELESCP MEGADYNE SMOKE EVAC 10FT

## (undated) DEVICE — SST TWIST DRILL, STANDARD, 2.4MM DIA. X 127MM: Brand: MICROAIRE®

## (undated) DEVICE — GLV SURG PREMIERPRO MIC LTX PF SZ6.5 BRN

## (undated) DEVICE — SUT MONOCRYL PLS ANTIB UND 3/0  PS1 27IN

## (undated) DEVICE — STRYKER PERFORMANCE SERIES SAGITTAL BLADE: Brand: STRYKER PERFORMANCE SERIES

## (undated) DEVICE — ANTIBACTERIAL UNDYED BRAIDED (POLYGLACTIN 910), SYNTHETIC ABSORBABLE SUTURE: Brand: COATED VICRYL

## (undated) DEVICE — GLV SURG SENSICARE PI ORTHO SZ8 LF STRL

## (undated) DEVICE — BLANKT WARM UPPR/BDY ARM/OUT 57X196CM

## (undated) DEVICE — DRSNG SURG AQUACEL AG 9X25CM

## (undated) DEVICE — BNDG ELAS CO-FLEX SLF ADHR 6IN 5YD LF STRL

## (undated) DEVICE — NEEDLE, QUINCKE 22GX3.5": Brand: MEDLINE INDUSTRIES, INC.

## (undated) DEVICE — ADHS SKIN PREMIERPRO EXOFIN TOPICAL HI/VISC .5ML

## (undated) DEVICE — GOWN,REINFORCE,POLY,SIRUS,BREATH SLV,XLG: Brand: MEDLINE